# Patient Record
Sex: MALE | Race: BLACK OR AFRICAN AMERICAN | Employment: FULL TIME | URBAN - METROPOLITAN AREA
[De-identification: names, ages, dates, MRNs, and addresses within clinical notes are randomized per-mention and may not be internally consistent; named-entity substitution may affect disease eponyms.]

---

## 2017-10-30 ENCOUNTER — OFFICE VISIT (OUTPATIENT)
Dept: FAMILY MEDICINE CLINIC | Age: 42
End: 2017-10-30

## 2017-10-30 VITALS
RESPIRATION RATE: 20 BRPM | DIASTOLIC BLOOD PRESSURE: 85 MMHG | HEART RATE: 79 BPM | WEIGHT: 240 LBS | HEIGHT: 75 IN | SYSTOLIC BLOOD PRESSURE: 122 MMHG | OXYGEN SATURATION: 97 % | BODY MASS INDEX: 29.84 KG/M2 | TEMPERATURE: 98.2 F

## 2017-10-30 DIAGNOSIS — R06.83 SNORING: ICD-10-CM

## 2017-10-30 DIAGNOSIS — H53.9 CHANGES IN VISION: ICD-10-CM

## 2017-10-30 DIAGNOSIS — Z23 ENCOUNTER FOR IMMUNIZATION: ICD-10-CM

## 2017-10-30 DIAGNOSIS — Z13.220 SCREENING, LIPID: ICD-10-CM

## 2017-10-30 DIAGNOSIS — Z00.00 ROUTINE GENERAL MEDICAL EXAMINATION AT A HEALTH CARE FACILITY: Primary | ICD-10-CM

## 2017-10-30 DIAGNOSIS — G47.00 INSOMNIA, UNSPECIFIED TYPE: ICD-10-CM

## 2017-10-30 NOTE — PROGRESS NOTES
Subjective:   Yuriy Mueller is a 36 y.o. male presenting for his annual checkup. Reports BP and cholesterol were high at a workplace health fair about 3 months ago. He stopped eating meat after receiving the results, has been exercising regularly, and has lost 15 lbs. ROS:  Feeling well. No dyspnea or chest pain on exertion. No abdominal pain, change in bowel habits, black or bloody stools. No urinary tract or prostatic symptoms. No neurological complaints. Specific concerns today: has had a small lump on upper back for several years, not changing, not tender or painful. It seemed to flatten out after lying on weight bench at the gym, but has returned to it's former shape. Having trouble sleeping- takes 1-2 benadryl nightly. Falls asleep okay, wakes up after 4-5 hours and can't fall asleep again. Also reports loud snoring, wakes himself up at times, wife reports pauses in breathing. Has noticed some improvement after recent weight loss, but symptoms continue to happen multiple times each night. Had a colonscopy about 2 years ago after having blood in stool. Screen was normal per his report, 5 year interval to rescreen was recommended. C/o recent change in vision, finds himself adjusting distance of reading materials to help see it clearly. Last Tdap more than 10 years ago. There is no problem list on file for this patient.     No Known Allergies  Past Medical History:   Diagnosis Date    History of difficulty sleeping      Past Surgical History:   Procedure Laterality Date    HX COLONOSCOPY       Family History   Problem Relation Age of Onset    Hypertension Mother     Cancer Father     Diabetes Father     Stroke Father      Social History   Substance Use Topics    Smoking status: Former Smoker     Years: 1.00     Quit date: 1/1/2011    Smokeless tobacco: Never Used    Alcohol use Yes      Comment: 20 drinks per week (liquor or wine), 20 year drinking history         Objective: Visit Vitals    /85 (BP 1 Location: Right arm, BP Patient Position: Sitting)    Pulse 79    Temp 98.2 °F (36.8 °C) (Oral)    Resp 20    Ht 6' 3\" (1.905 m)    Wt 240 lb (108.9 kg)    SpO2 97%    BMI 30 kg/m2     The patient appears well, alert, oriented x 3, in no distress. ENT normal.  Neck supple. No adenopathy or thyromegaly. JOSSELIN. Lungs are clear, good air entry, no wheezes, rhonchi or rales. S1 and S2 normal, no murmurs, regular rate and rhythm. Abdomen is soft without tenderness, guarding, mass or organomegaly. Extremities show no edema, normal peripheral pulses. Neurological is normal without focal findings. Skin shows normal coloration and turgor, small, nontender lipoma upper back near midline. Assessment/Plan:   healthy adult male  lose weight, increase physical activity, limit alcohol consumption, follow low fat diet, follow low salt diet, routine labs ordered. Diagnoses and all orders for this visit:    1. Routine general medical examination at a health care facility  Recommend continued weight loss through healthy diet and regular exercise. Limit alcohol consumption- no more than 2 drinks nightly recommended for adult males  -     CBC WITH AUTOMATED DIFF  -     METABOLIC PANEL, COMPREHENSIVE    2. Encounter for immunization  -     Tetanus, diphtheria toxoids and acellular pertussis (TDAP) vaccine, in individuals >=7 years, IM  -     IL IMMUNIZ ADMIN,1 SINGLE/COMB VAC/TOXOID  -     Influenza virus vaccine (QUADRIVALENT PRES FREE SYRINGE) IM (04557)    3. Screening, lipid  -     LIPID PANEL    4. Snoring  5. Insomnia, unspecified type  -     SLEEP MEDICINE REFERRAL  -     TSH 3RD GENERATION    6.  Changes in vision  Suspect normal age-related changes  Refer for further eval  -     REFERRAL TO OPTOMETRY    Follow-up Disposition:  Return in about 1 year (around 10/30/2018) for yearly physical.     I have discussed the diagnosis with the patient and the intended plan as seen in the above orders. The patient has received an after-visit summary and questions were answered concerning future plans. Patient conveyed understanding of the plan at the time of the visit.     Duncan Muniz NP  10/30/17

## 2017-10-30 NOTE — PATIENT INSTRUCTIONS
Vaccine Information Statement     Tdap (Tetanus, Diphtheria, Pertussis) Vaccine: What You Need to Know    Many Vaccine Information Statements are available in [place] and other languages. See www.immunize.org/vis. Hojas de Información Sobre Vacunas están disponibles en español y en muchos otros idiomas. Visite TongScale.si    1. Why get vaccinated? Tetanus, diphtheria, and pertussis are very serious diseases. Tdap vaccine can protect us from these diseases. And, Tdap vaccine given to pregnant women can protect  babies against pertussis. TETANUS (Lockjaw) is rare in the [facility] today. It causes painful muscle tightening and stiffness, usually all over the body.  It can lead to tightening of muscles in the head and neck so you cant open your mouth, swallow, or sometimes even breathe. Tetanus kills about 1 out of 10 people who are infected even after receiving the best medical care. DIPHTHERIA is also rare in the [facility] today. It can cause a thick coating to form in the back of the throat.  It can lead to breathing problems, heart failure, paralysis, and death. PERTUSSIS (Whooping Cough) causes severe coughing spells, which can cause difficulty breathing, vomiting, and disturbed sleep.  It can also lead to weight loss, incontinence, and rib fractures. Up to 2 in 100 adolescents and 5 in 100 adults with pertussis are hospitalized or have complications, which could include pneumonia or death. These diseases are caused by bacteria. Diphtheria and pertussis are spread from person to person through secretions from coughing or sneezing. Tetanus enters the body through cuts, scratches, or wounds. Before vaccines, as many as 200,000 cases of diphtheria, 200,000 cases of pertussis, and hundreds of cases of tetanus, were reported in the [place] each year.  Since vaccination began, reports of cases for tetanus and diphtheria have dropped by about 99% and for pertussis by about 80%. 2. Tdap vaccine    Tdap vaccine can protect adolescents and adults from tetanus, diphtheria, and pertussis. One dose of Tdap is routinely given at age 6 or 15. People who did not get Tdap at that age should get it as soon as possible. Tdap is especially important for health care professionals and anyone having close contact with a baby younger than 12 months. Pregnant women should get a dose of Tdap during every pregnancy, to protect the  from pertussis. Infants are most at risk for severe, life-threatening complications from pertussis. Another vaccine, called Td, protects against tetanus and diphtheria, but not pertussis. A Td booster should be given every 10 years. Tdap may be given as one of these boosters if you have never gotten Tdap before. Tdap may also be given after a severe cut or burn to prevent tetanus infection. Your doctor or the person giving you the vaccine can give you more information. Tdap may safely be given at the same time as other vaccines. 3. Some people should not get this vaccine     A person who has ever had a life-threatening allergic reaction after a previous dose of any diphtheria, tetanus or pertussis containing vaccine, OR has a severe allergy to any part of this vaccine, should not get Tdap vaccine. Tell the person giving the vaccine about any severe allergies.  Anyone who had coma or long repeated seizures within 7 days after a childhood dose of DTP or DTaP, or a previous dose of Tdap, should not get Tdap, unless a cause other than the vaccine was found. They can still get Td.  Talk to your doctor if you:  - have seizures or another nervous system problem,  - had severe pain or swelling after any vaccine containing diphtheria, tetanus or pertussis,   - ever had a condition called Guillain Barré Syndrome (GBS),  - arent feeling well on the day the shot is scheduled.     4. Risks    With any medicine, including vaccines, there is a chance of side effects. These are usually mild and go away on their own. Serious reactions are also possible but are rare. Most people who get Tdap vaccine do not have any problems with it. Mild Problems following Tdap  (Did not interfere with activities)   Pain where the shot was given (about 3 in 4 adolescents or 2 in 3 adults)   Redness or swelling where the shot was given (about 1 person in 5)   Mild fever of at least 100.4°F (up to about 1 in 25 adolescents or 1 in 100 adults)   Headache (about 3 or 4 people in 10)   Tiredness (about 1 person in 3 or 4)   Nausea, vomiting, diarrhea, stomach ache (up to 1 in 4 adolescents or 1 in 10 adults)   Chills,  sore joints (about 1 person in 10)   Body aches (about 1 person in 3 or 4)    Rash, swollen glands (uncommon)    Moderate Problems following Tdap  (Interfered with activities, but did not require medical attention)   Pain where the shot was given (up to 1 in 5 or 6)    Redness or swelling where the shot was given (up to about 1 in 16 adolescents or 1 in 12 adults)   Fever over 102°F (about 1 in 100 adolescents or 1 in 250 adults)   Headache (about 1 in 7 adolescents or 1 in 10 adults)   Nausea, vomiting, diarrhea, stomach ache (up to 1 or 3 people in 100)   Swelling of the entire arm where the shot was given (up to about 1 in 500). Severe Problems following Tdap  (Unable to perform usual activities; required medical attention)   Swelling, severe pain, bleeding, and redness in the arm where the shot was given (rare). Problems that could happen after any vaccine:     People sometimes faint after a medical procedure, including vaccination. Sitting or lying down for about 15 minutes can help prevent fainting, and injuries caused by a fall. Tell your doctor if you feel dizzy, or have vision changes or ringing in the ears.      Some people get severe pain in the shoulder and have difficulty moving the arm where a shot was given. This happens very rarely.  Any medication can cause a severe allergic reaction. Such reactions from a vaccine are very rare, estimated at fewer than 1 in a million doses, and would happen within a few minutes to a few hours after the vaccination. As with any medicine, there is a very remote chance of a vaccine causing a serious injury or death. The safety of vaccines is always being monitored. For more information, visit: www.cdc.gov/vaccinesafety/    5. What if there is a serious problem? What should I look for?  Look for anything that concerns you, such as signs of a severe allergic reaction, very high fever, or unusual behavior.  Signs of a severe allergic reaction can include hives, swelling of the face and throat, difficulty breathing, a fast heartbeat, dizziness, and weakness. These would usually start a few minutes to a few hours after the vaccination. What should I do?  If you think it is a severe allergic reaction or other emergency that cant wait, call 9-1-1 or get the person to the nearest hospital. Otherwise, call your doctor.  Afterward, the reaction should be reported to the Vaccine Adverse Event Reporting System (VAERS). Your doctor might file this report, or you can do it yourself through the VAERS web site at www.vaers. [facility].gov, or by calling 1-914.964.8403. [employer] does not give medical advice. 6. The National Vaccine Injury Compensation Program    The [facility] Vaccine Injury Compensation Program (VICP) is a federal program that was created to compensate people who may have been injured by certain vaccines. Persons who believe they may have been injured by a vaccine can learn about the program and about filing a claim by calling 6-423.998.7595 or visiting the [employer] website at www.[facility].gov/vaccinecompensation. There is a time limit to file a claim for compensation. 7. How can I learn more?  Ask your doctor.  He or she can give you the vaccine package insert or suggest other sources of information.  Call your local or state health department.  Contact the Centers for Disease Control and Prevention (CDC):  - Call 8-300.198.7746 (1-800-CDC-INFO) or  - Visit CDCs website at www.cdc.gov/vaccines      Vaccine Information Statement   Tdap Vaccine  (2/24/2015)  42 RAHEEM Gomez [id number]    Department of Health and Human Services  Centers for Disease Control and Prevention    Office Use Only       Learning About Sleeping Well  What does sleeping well mean? Sleeping well means getting enough sleep. How much sleep is enough varies among people. The number of hours you sleep is not as important as how you feel when you wake up. If you do not feel refreshed, you probably need more sleep. Another sign of not getting enough sleep is feeling tired during the day. The average total nightly sleep time is 7½ to 8 hours. Healthy adults may need a little more or a little less than this. Why is getting enough sleep important? Getting enough quality sleep is a basic part of good health. When your sleep suffers, your mood and your thoughts can suffer too. You may find yourself feeling more grumpy or stressed. Not getting enough sleep also can lead to serious problems, including injury, accidents, anxiety, and depression. What might cause poor sleeping? Many things can cause sleep problems, including:  · Stress. Stress can be caused by fear about a single event, such as giving a speech. Or you may have ongoing stress, such as worry about work or school. · Depression, anxiety, and other mental or emotional conditions. · Changes in your sleep habits or surroundings. This includes changes that happen where you sleep, such as noise, light, or sleeping in a different bed. It also includes changes in your sleep pattern, such as having jet lag or working a late shift. · Health problems, such as pain, breathing problems, and restless legs syndrome.   · Lack of regular exercise. How can you help yourself? Here are some tips that may help you sleep more soundly and wake up feeling more refreshed. Your sleeping area  · Use your bedroom only for sleeping and sex. A bit of light reading may help you fall asleep. But if it doesn't, do your reading elsewhere in the house. Don't watch TV in bed. · Be sure your bed is big enough to stretch out comfortably, especially if you have a sleep partner. · Keep your bedroom quiet, dark, and cool. Use curtains, blinds, or a sleep mask to block out light. To block out noise, use earplugs, soothing music, or a \"white noise\" machine. Your evening and bedtime routine  · Create a relaxing bedtime routine. You might want to take a warm shower or bath, listen to soothing music, or drink a cup of noncaffeinated tea. · Go to bed at the same time every night. And get up at the same time every morning, even if you feel tired. What to avoid  · Limit caffeine (coffee, tea, caffeinated sodas) during the day, and don't have any for at least 4 to 6 hours before bedtime. · Don't drink alcohol before bedtime. Alcohol can cause you to wake up more often during the night. · Don't smoke or use tobacco, especially in the evening. Nicotine can keep you awake. · Don't take naps during the day, especially close to bedtime. · Don't lie in bed awake for too long. If you can't fall asleep, or if you wake up in the middle of the night and can't get back to sleep within 15 minutes or so, get out of bed and go to another room until you feel sleepy. · Don't take medicine right before bed that may keep you awake or make you feel hyper or energized. Your doctor can tell you if your medicine may do this and if you can take it earlier in the day. If you can't sleep  · Imagine yourself in a peaceful, pleasant scene. Focus on the details and feelings of being in a place that is relaxing. · Get up and do a quiet or boring activity until you feel sleepy.   · Don't drink any liquids after 6 p.m. if you wake up often because you have to go to the bathroom. Where can you learn more? Go to http://danitza-codey.info/. Enter J834 in the search box to learn more about \"Learning About Sleeping Well. \"  Current as of: May 12, 2017  Content Version: 11.4  © 7192-1691 [employer]. Care instructions adapted under license by [employer] (which disclaims liability or warranty for this information). If you have questions about a medical condition or this instruction, always ask your healthcare professional. [place] [address] any warranty or liability for your use of this information. Well Visit, Ages 25 to 48: Care Instructions  Your Care Instructions    Physical exams can help you stay healthy. Your doctor has checked your overall health and may have suggested ways to take good care of yourself. He or she also may have recommended tests. At home, you can help prevent illness with healthy eating, regular exercise, and other steps. Follow-up care is a key part of your treatment and safety. Be sure to make and go to all appointments, and call your doctor if you are having problems. It's also a good idea to know your test results and keep a list of the medicines you take. How can you care for yourself at home? · Reach and stay at a healthy weight. This will lower your risk for many problems, such as obesity, diabetes, heart disease, and high blood pressure. · Get at least 30 minutes of physical activity on most days of the week. Walking is a good choice. You also may want to do other activities, such as running, swimming, cycling, or playing tennis or team sports. Discuss any changes in your exercise program with your doctor. · Do not smoke or allow others to smoke around you. If you need help quitting, talk to your doctor about stop-smoking programs and medicines. These can increase your chances of quitting for good.   · Talk to your doctor about whether you have any risk factors for sexually transmitted infections (STIs). Having one sex partner (who does not have STIs and does not have sex with anyone else) is a good way to avoid these infections. · Use birth control if you do not want to have children at this time. Talk with your doctor about the choices available and what might be best for you. · Protect your skin from too much sun. When you're outdoors from 10 a.m. to 4 p.m., stay in the shade or cover up with clothing and a hat with a wide brim. Wear sunglasses that block UV rays. Even when it's cloudy, put broad-spectrum sunscreen (SPF 30 or higher) on any exposed skin. · See a dentist one or two times a year for checkups and to have your teeth cleaned. · Wear a seat belt in the car. · Drink alcohol in moderation, if at all. That means no more than 2 drinks a day for men and 1 drink a day for women. Follow your doctor's advice about when to have certain tests. These tests can spot problems early. For everyone  · Cholesterol. Have the fat (cholesterol) in your blood tested after age 21. Your doctor will tell you how often to have this done based on your age, family history, or other things that can increase your risk for heart disease. · Blood pressure. Have your blood pressure checked during a routine doctor visit. Your doctor will tell you how often to check your blood pressure based on your age, your blood pressure results, and other factors. · Vision. Talk with your doctor about how often to have a glaucoma test.  · Diabetes. Ask your doctor whether you should have tests for diabetes. · Colon cancer. Have a test for colon cancer at age 48. You may have one of several tests. If you are younger than 48, you may need a test earlier if you have any risk factors. Risk factors include whether you already had a precancerous polyp removed from your colon or whether your parent, brother, sister, or child has had colon cancer.   For women  · Breast exam and mammogram. Talk to your doctor about when you should have a clinical breast exam and a mammogram. Medical experts differ on whether and how often women under 50 should have these tests. Your doctor can help you decide what is right for you. · Pap test and pelvic exam. Begin Pap tests at age 24. A Pap test is the best way to find cervical cancer. The test often is part of a pelvic exam. Ask how often to have this test.  · Tests for sexually transmitted infections (STIs). Ask whether you should have tests for STIs. You may be at risk if you have sex with more than one person, especially if your partners do not wear condoms. For men  · Tests for sexually transmitted infections (STIs). Ask whether you should have tests for STIs. You may be at risk if you have sex with more than one person, especially if you do not wear a condom. · Testicular cancer exam. Ask your doctor whether you should check your testicles regularly. · Prostate exam. Talk to your doctor about whether you should have a blood test (called a PSA test) for prostate cancer. Experts differ on whether and when men should have this test. Some experts suggest it if you are older than 39 and are -American or have a father or brother who got prostate cancer when he was younger than 72. When should you call for help? Watch closely for changes in your health, and be sure to contact your doctor if you have any problems or symptoms that concern you. Where can you learn more? Go to http://danitza-codey.info/. Enter P072 in the search box to learn more about \"Well Visit, Ages 25 to 48: Care Instructions. \"  Current as of: May 12, 2017  Content Version: 11.4  © 6938-5592 [employer]. Care instructions adapted under license by [employer] (which disclaims liability or warranty for this information).  If you have questions about a medical condition or this instruction, always ask your healthcare professional. [address] any warranty or liability for your use of this information.

## 2017-10-30 NOTE — PROGRESS NOTES
Chief Complaint   Patient presents with   [facility] patient     Physical

## 2017-10-30 NOTE — MR AVS SNAPSHOT
Visit Information Date & Time Provider Department Dept. Phone Encounter #  
 10/30/2017  8:30 AM Melo Dawn NP [facility]9 [facility] [id number] Follow-up Instructions Return in about 1 year (around 10/30/2018) for yearly physical.  
  
Upcoming Health Maintenance Date Due DTaP/Tdap/Td series (1 - Tdap) 4/5/1995 INFLUENZA AGE 9 TO ADULT 8/1/2017 Allergies as of 10/30/2017  Review Complete On: 10/30/2017 By: Melo Dawn NP No Known Allergies Current Immunizations  Never Reviewed Name Date Tdap  Incomplete Not reviewed this visit You Were Diagnosed With   
  
 Codes Comments Routine general medical examination at a health care facility    -  Primary ICD-10-CM: Z00.00 ICD-9-CM: V70.0 Encounter for immunization     ICD-10-CM: B13 ICD-9-CM: V03.89 Screening, lipid     ICD-10-CM: Z92.366 ICD-9-CM: V77.91 Snoring     ICD-10-CM: R06.83 
ICD-9-CM: 786.09 Insomnia, unspecified type     ICD-10-CM: G47.00 ICD-9-CM: 780.52 Changes in vision     ICD-10-CM: H53.9 ICD-9-CM: 368.9 Vitals BP Pulse Temp Resp Height(growth percentile) Weight(growth percentile) 122/85 (BP 1 Location: Right arm, BP Patient Position: Sitting) 79 98.2 °F (36.8 °C) (Oral) 20 6' 3\" (1.905 m) 240 lb (108.9 kg) SpO2 BMI Smoking Status 97% 30 kg/m2 Former Smoker Vitals History BMI and BSA Data Body Mass Index Body Surface Area  
 30 kg/m 2 2.4 m 2 Preferred Pharmacy Pharmacy Name Phone [id number] [address] [facility] [address] [phone] Your Updated Medication List  
  
   
This list is accurate as of: 10/30/17  9:16 AM.  Always use your most recent med list.  
  
  
  
  
 UNISOM (DIPHENHYDRAMINE) PO Take  by mouth. We Performed the Following [facility] IMMUNIZ ADMIN,1 SINGLE/COMB VAC/TOXOID [id number] CPT(R)] REFERRAL TO OPTOMETRY [id number] Custom] Comments:  
 eval for routine eye exam, recent change in vision SLEEP MEDICINE REFERRAL [[id number] Custom] Comments:  
 Orders: 
Sleep Medicine Consult - Schedule patient for a sleep specialist consult. If appropriate, schedule patient for sleep study(s). Initiate treatment if needed. Forward correspondance to my office. TETANUS, DIPHTHERIA TOXOIDS AND ACELLULAR PERTUSSIS VACCINE (TDAP), IN INDIVIDS. >=7, IM [id number] CPT(R)] Follow-up Instructions Return in about 1 year (around 10/30/2018) for yearly physical.  
  
  
Referral Information Referral ID Referred By Referred To  
  
 [id number] Gloria Huber MD   
   [facility] [place] [address] Phone: [phone] Fax: [phone] Visits Status Start Date End Date 1 New Request 10/30/17 10/30/18 If your referral has a status of pending review or denied, additional information will be sent to support the outcome of this decision. Referral ID Referred By Referred To  
 [id number] Lynette Gomez [facility], OD  
   [address] [address] [facility] Phone: [phone] Fax: [phone] Visits Status Start Date End Date 1 New Request 10/30/17 10/30/18 If your referral has a status of pending review or denied, additional information will be sent to support the outcome of this decision. Patient Instructions Vaccine Information Statement Tdap (Tetanus, Diphtheria, Pertussis) Vaccine: What You Need to Know Many Vaccine Information Statements are available in Nauruan and other languages. See www.immunize.org/vis. Hojas de Información Sobre Vacunas están disponibles en español y en muchos otros idiomas. Visite MonetteScale.si 1. Why get vaccinated? Tetanus, diphtheria, and pertussis are very serious diseases.  Tdap vaccine can protect us from these diseases. And, Tdap vaccine given to pregnant women can protect  babies against pertussis. TETANUS (Lockjaw) is rare in the [facility] today. It causes painful muscle tightening and stiffness, usually all over the body. ? It can lead to tightening of muscles in the head and neck so you cant open your mouth, swallow, or sometimes even breathe. Tetanus kills about 1 out of 10 people who are infected even after receiving the best medical care. DIPHTHERIA is also rare in the [facility] today. It can cause a thick coating to form in the back of the throat. ? It can lead to breathing problems, heart failure, paralysis, and death. PERTUSSIS (Whooping Cough) causes severe coughing spells, which can cause difficulty breathing, vomiting, and disturbed sleep. ? It can also lead to weight loss, incontinence, and rib fractures. Up to 2 in 100 adolescents and 5 in 100 adults with pertussis are hospitalized or have complications, which could include pneumonia or death. These diseases are caused by bacteria. Diphtheria and pertussis are spread from person to person through secretions from coughing or sneezing. Tetanus enters the body through cuts, scratches, or wounds. Before vaccines, as many as 200,000 cases of diphtheria, 200,000 cases of pertussis, and hundreds of cases of tetanus, were reported in the [place] each year. Since vaccination began, reports of cases for tetanus and diphtheria have dropped by about 99% and for pertussis by about 80%. 2. Tdap vaccine Tdap vaccine can protect adolescents and adults from tetanus, diphtheria, and pertussis. One dose of Tdap is routinely given at age 6 or 15. People who did not get Tdap at that age should get it as soon as possible. Tdap is especially important for health care professionals and anyone having close contact with a baby younger than 12 months. Pregnant women should get a dose of Tdap during every pregnancy, to protect the  from pertussis. Infants are most at risk for severe, life-threatening complications from pertussis. Another vaccine, called Td, protects against tetanus and diphtheria, but not pertussis. A Td booster should be given every 10 years. Tdap may be given as one of these boosters if you have never gotten Tdap before. Tdap may also be given after a severe cut or burn to prevent tetanus infection. Your doctor or the person giving you the vaccine can give you more information. Tdap may safely be given at the same time as other vaccines. 3. Some people should not get this vaccine  A person who has ever had a life-threatening allergic reaction after a previous dose of any diphtheria, tetanus or pertussis containing vaccine, OR has a severe allergy to any part of this vaccine, should not get Tdap vaccine. Tell the person giving the vaccine about any severe allergies.  Anyone who had coma or long repeated seizures within 7 days after a childhood dose of DTP or DTaP, or a previous dose of Tdap, should not get Tdap, unless a cause other than the vaccine was found. They can still get Td.  Talk to your doctor if you: 
- have seizures or another nervous system problem, 
- had severe pain or swelling after any vaccine containing diphtheria, tetanus or pertussis,  
- ever had a condition called Guillain Barré Syndrome (GBS), 
- arent feeling well on the day the shot is scheduled. 4. Risks With any medicine, including vaccines, there is a chance of side effects. These are usually mild and go away on their own. Serious reactions are also possible but are rare. Most people who get Tdap vaccine do not have any problems with it. Mild Problems following Tdap 
(Did not interfere with activities)  Pain where the shot was given (about 3 in 4 adolescents or 2 in 3 adults)  Redness or swelling where the shot was given (about 1 person in 5)  Mild fever of at least 100.4°F (up to about 1 in 25 adolescents or 1 in 100 adults)  Headache (about 3 or 4 people in 10)  Tiredness (about 1 person in 3 or 4)  Nausea, vomiting, diarrhea, stomach ache (up to 1 in 4 adolescents or 1 in 10 adults)  Chills,  sore joints (about 1 person in 10)  Body aches (about 1 person in 3 or 4)  Rash, swollen glands (uncommon) Moderate Problems following Tdap (Interfered with activities, but did not require medical attention)  Pain where the shot was given (up to 1 in 5 or 6)  Redness or swelling where the shot was given (up to about 1 in 16 adolescents or 1 in 12 adults)  Fever over 102°F (about 1 in 100 adolescents or 1 in 250 adults)  Headache (about 1 in 7 adolescents or 1 in 10 adults)  Nausea, vomiting, diarrhea, stomach ache (up to 1 or 3 people in 100)  Swelling of the entire arm where the shot was given (up to about 1 in 500). Severe Problems following Tdap 
(Unable to perform usual activities; required medical attention)  Swelling, severe pain, bleeding, and redness in the arm where the shot was given (rare). Problems that could happen after any vaccine:  People sometimes faint after a medical procedure, including vaccination. Sitting or lying down for about 15 minutes can help prevent fainting, and injuries caused by a fall. Tell your doctor if you feel dizzy, or have vision changes or ringing in the ears.  Some people get severe pain in the shoulder and have difficulty moving the arm where a shot was given. This happens very rarely.  Any medication can cause a severe allergic reaction. Such reactions from a vaccine are very rare, estimated at fewer than 1 in a million doses, and would happen within a few minutes to a few hours after the vaccination.   
 
As with any medicine, there is a very remote chance of a vaccine causing a serious injury or death. The safety of vaccines is always being monitored. For more information, visit: www.cdc.gov/vaccinesafety/ 
 
 
The [facility] Vaccine Injury Compensation Program (VICP) is a federal program that was created to compensate people who may have been injured by certain vaccines. Persons who believe they may have been injured by a vaccine can learn about the program and about filing a claim by calling 9-170.476.1960 or visiting the [employer] website at www.[facility].gov/vaccinecompensation. There is a time limit to file a claim for compensation. 7. How can I learn more?  Ask your doctor. He or she can give you the vaccine package insert or suggest other sources of information.  Call your local or state health department.  Contact the Centers for Disease Control and Prevention (CDC): 
- Call 0-365.215.3504 (1-800-CDC-INFO) or 
- Visit CDCs website at www.cdc.gov/vaccines Vaccine Information Statement Tdap Vaccine 
(2/24/2015) 42 PAULAAnne Guzmán Alfredo 762HK-95 Department of Health and [employer] Centers for Disease Control and Prevention Office Use Only Learning About Sleeping Well What does sleeping well mean? Sleeping well means getting enough sleep. How much sleep is enough varies among people. The number of hours you sleep is not as important as how you feel when you wake up. If you do not feel refreshed, you probably need more sleep. Another sign of not getting enough sleep is feeling tired during the day. The average total nightly sleep time is 7½ to 8 hours. Healthy adults may need a little more or a little less than this. Why is getting enough sleep important? Getting enough quality sleep is a basic part of good health. When your sleep suffers, your mood and your thoughts can suffer too. You may find yourself feeling more grumpy or stressed. Not getting enough sleep also can lead to serious problems, including injury, accidents, anxiety, and depression. What might cause poor sleeping? Many things can cause sleep problems, including: · Stress. Stress can be caused by fear about a single event, such as giving a speech. Or you may have ongoing stress, such as worry about work or school. · Depression, anxiety, and other mental or emotional conditions. · Changes in your sleep habits or surroundings. This includes changes that happen where you sleep, such as noise, light, or sleeping in a different bed. It also includes changes in your sleep pattern, such as having jet lag or working a late shift. · Health problems, such as pain, breathing problems, and restless legs syndrome. · Lack of regular exercise. How can you help yourself? Here are some tips that may help you sleep more soundly and wake up feeling more refreshed. Your sleeping area · Use your bedroom only for sleeping and sex. A bit of light reading may help you fall asleep. But if it doesn't, do your reading elsewhere in the house. Don't watch TV in bed. · Be sure your bed is big enough to stretch out comfortably, especially if you have a sleep partner. · Keep your bedroom quiet, dark, and cool. Use curtains, blinds, or a sleep mask to block out light. To block out noise, use earplugs, soothing music, or a \"white noise\" machine. Your evening and bedtime routine · Create a relaxing bedtime routine. You might want to take a warm shower or bath, listen to soothing music, or drink a cup of noncaffeinated tea. · Go to bed at the same time every night. And get up at the same time every morning, even if you feel tired. What to avoid · Limit caffeine (coffee, tea, caffeinated sodas) during the day, and don't have any for at least 4 to 6 hours before bedtime. · Don't drink alcohol before bedtime. Alcohol can cause you to wake up more often during the night. · Don't smoke or use tobacco, especially in the evening. Nicotine can keep you awake. · Don't take naps during the day, especially close to bedtime. · Don't lie in bed awake for too long. If you can't fall asleep, or if you wake up in the middle of the night and can't get back to sleep within 15 minutes or so, get out of bed and go to another room until you feel sleepy. · Don't take medicine right before bed that may keep you awake or make you feel hyper or energized. Your doctor can tell you if your medicine may do this and if you can take it earlier in the day. If you can't sleep · Imagine yourself in a peaceful, pleasant scene. Focus on the details and feelings of being in a place that is relaxing. · Get up and do a quiet or boring activity until you feel sleepy. · Don't drink any liquids after 6 p.m. if you wake up often because you have to go to the bathroom. Where can you learn more? Go to http://danitza-codey.info/. Enter M386 in the search box to learn more about \"Learning About Sleeping Well. \" Current as of: May 12, 2017 Content Version: 11.4 © 7662-9504 Healthwise, [employer]. Care instructions adapted under license by [employer] (which disclaims liability or warranty for this information). If you have questions about a medical condition or this instruction, always ask your healthcare professional. [address] any warranty or liability for your use of this information. Well Visit, Ages 25 to 48: Care Instructions Your Care Instructions Physical exams can help you stay healthy. Your doctor has checked your overall health and may have suggested ways to take good care of yourself. He or she also may have recommended tests. At home, you can help prevent illness with healthy eating, regular exercise, and other steps. Follow-up care is a key part of your treatment and safety. Be sure to make and go to all appointments, and call your doctor if you are having problems. It's also a good idea to know your test results and keep a list of the medicines you take. How can you care for yourself at home? · Reach and stay at a healthy weight. This will lower your risk for many problems, such as obesity, diabetes, heart disease, and high blood pressure. · Get at least 30 minutes of physical activity on most days of the week. Walking is a good choice. You also may want to do other activities, such as running, swimming, cycling, or playing tennis or team sports. Discuss any changes in your exercise program with your doctor. · Do not smoke or allow others to smoke around you. If you need help quitting, talk to your doctor about stop-smoking programs and medicines. These can increase your chances of quitting for good. · Talk to your doctor about whether you have any risk factors for sexually transmitted infections (STIs). Having one sex partner (who does not have STIs and does not have sex with anyone else) is a good way to avoid these infections. · Use birth control if you do not want to have children at this time.  Talk with your doctor about the choices available and what might be best for you. · Protect your skin from too much sun. When you're outdoors from 10 a.m. to 4 p.m., stay in the shade or cover up with clothing and a hat with a wide brim. Wear sunglasses that block UV rays. Even when it's cloudy, put broad-spectrum sunscreen (SPF 30 or higher) on any exposed skin. · See a dentist one or two times a year for checkups and to have your teeth cleaned. · Wear a seat belt in the car. · Drink alcohol in moderation, if at all. That means no more than 2 drinks a day for men and 1 drink a day for women. Follow your doctor's advice about when to have certain tests. These tests can spot problems early. For everyone · Cholesterol. Have the fat (cholesterol) in your blood tested after age 21. Your doctor will tell you how often to have this done based on your age, family history, or other things that can increase your risk for heart disease. · Blood pressure. Have your blood pressure checked during a routine doctor visit. Your doctor will tell you how often to check your blood pressure based on your age, your blood pressure results, and other factors. · Vision. Talk with your doctor about how often to have a glaucoma test. 
· Diabetes. Ask your doctor whether you should have tests for diabetes. · Colon cancer. Have a test for colon cancer at age 48. You may have one of several tests. If you are younger than 48, you may need a test earlier if you have any risk factors. Risk factors include whether you already had a precancerous polyp removed from your colon or whether your parent, brother, sister, or child has had colon cancer. For women · Breast exam and mammogram. Talk to your doctor about when you should have a clinical breast exam and a mammogram. Medical experts differ on whether and how often women under 50 should have these tests. Your doctor can help you decide what is right for you. · Pap test and pelvic exam. Begin Pap tests at age 24. A Pap test is the best way to find cervical cancer. The test often is part of a pelvic exam. Ask how often to have this test. 
· Tests for sexually transmitted infections (STIs). Ask whether you should have tests for STIs. You may be at risk if you have sex with more than one person, especially if your partners do not wear condoms. For men · Tests for sexually transmitted infections (STIs). Ask whether you should have tests for STIs. You may be at risk if you have sex with more than one person, especially if you do not wear a condom. · Testicular cancer exam. Ask your doctor whether you should check your testicles regularly. · Prostate exam. Talk to your doctor about whether you should have a blood test (called a PSA test) for prostate cancer. Experts differ on whether and when men should have this test. Some experts suggest it if you are older than 39 and are -American or have a father or brother who got prostate cancer when he was younger than 72. When should you call for help? Watch closely for changes in your health, and be sure to contact your doctor if you have any problems or symptoms that concern you. Where can you learn more? Go to http://danitza-codey.info/. Enter P072 in the search box to learn more about \"Well Visit, Ages 25 to 48: Care Instructions. \" Current as of: May 12, 2017 Content Version: 11.4 © 0984-3714 Healthwise, Inc[employer]. Care instructions adapted under license by [employer] (which disclaims liability or warranty for this information). If you have questions about a medical condition or this instruction, always ask your healthcare professional. [address] any warranty or liability for your use of this information. Introducing [facility]!    
 Candis Moyer introduces [employer] patient portal. Now you can access parts of your medical record, email your doctor's office, and request medication refills online. 1. In your internet browser, go to [email or web address]/[email or web address] 2. Click on the First Time User? Click Here link in the Sign In box. You will see the New Member Sign Up page. 3. Enter your [employer] Access Code exactly as it appears below. You will not need to use this code after youve completed the sign-up process. If you do not sign up before the expiration date, you must request a new code. · [employer] Access Code: [email or web address] Expires: 1/28/2018  9:13 AM 
 
4. Enter the last four digits of your Social Security Number (xxxx) and Date of Birth (mm/dd/yyyy) as indicated and click Submit. You will be taken to the next sign-up page. 5. Create a [employer] ID. This will be your [employer] login ID and cannot be changed, so think of one that is secure and easy to remember. 6. Create a [employer] password. You can change your password at any time. 7. Enter your Password Reset Question and Answer. This can be used at a later time if you forget your password. 8. Enter your e-mail address. You will receive e-mail notification when new information is available in [address] 9. Click Sign Up. You can now view and download portions of your medical record. 10. Click the Download Summary menu link to download a portable copy of your medical information. If you have questions, please visit the Frequently Asked Questions section of the [employer] website. Remember, [employer] is NOT to be used for urgent needs. For medical emergencies, dial 911. Now available from your iPhone and Android! Please provide this summary of care documentation to your next provider. If you have any questions after today's visit, please call [phone].

## 2017-10-31 LAB
ALBUMIN SERPL-MCNC: 4.4 G/DL (ref 3.5–5.5)
ALBUMIN/GLOB SERPL: 1.4 {RATIO} (ref 1.2–2.2)
ALP SERPL-CCNC: 78 IU/L (ref 39–117)
ALT SERPL-CCNC: 34 IU/L (ref 0–44)
AST SERPL-CCNC: 32 IU/L (ref 0–40)
BASOPHILS # BLD AUTO: 0 X10E3/UL (ref 0–0.2)
BASOPHILS NFR BLD AUTO: 0 %
BILIRUB SERPL-MCNC: 0.5 MG/DL (ref 0–1.2)
BUN SERPL-MCNC: 14 MG/DL (ref 6–24)
BUN/CREAT SERPL: 12 (ref 9–20)
CALCIUM SERPL-MCNC: 9.4 MG/DL (ref 8.7–10.2)
CHLORIDE SERPL-SCNC: 98 MMOL/L (ref 96–106)
CHOLEST SERPL-MCNC: 226 MG/DL (ref 100–199)
CO2 SERPL-SCNC: 23 MMOL/L (ref 18–29)
CREAT SERPL-MCNC: 1.17 MG/DL (ref 0.76–1.27)
EOSINOPHIL # BLD AUTO: 0.1 X10E3/UL (ref 0–0.4)
EOSINOPHIL NFR BLD AUTO: 2 %
ERYTHROCYTE [DISTWIDTH] IN BLOOD BY AUTOMATED COUNT: 13.8 % (ref 12.3–15.4)
GFR SERPLBLD CREATININE-BSD FMLA CKD-EPI: 76 ML/MIN/1.73
GFR SERPLBLD CREATININE-BSD FMLA CKD-EPI: 88 ML/MIN/1.73
GLOBULIN SER CALC-MCNC: 3.2 G/DL (ref 1.5–4.5)
GLUCOSE SERPL-MCNC: 91 MG/DL (ref 65–99)
HCT VFR BLD AUTO: 44.2 % (ref 37.5–51)
HDLC SERPL-MCNC: 48 MG/DL
HGB BLD-MCNC: 14.5 G/DL (ref 12.6–17.7)
IMM GRANULOCYTES # BLD: 0 X10E3/UL (ref 0–0.1)
IMM GRANULOCYTES NFR BLD: 0 %
INTERPRETATION, 910389: NORMAL
LDLC SERPL CALC-MCNC: 123 MG/DL (ref 0–99)
LYMPHOCYTES # BLD AUTO: 1.4 X10E3/UL (ref 0.7–3.1)
LYMPHOCYTES NFR BLD AUTO: 24 %
MCH RBC QN AUTO: 29.8 PG (ref 26.6–33)
MCHC RBC AUTO-ENTMCNC: 32.8 G/DL (ref 31.5–35.7)
MCV RBC AUTO: 91 FL (ref 79–97)
MONOCYTES # BLD AUTO: 0.5 X10E3/UL (ref 0.1–0.9)
MONOCYTES NFR BLD AUTO: 9 %
NEUTROPHILS # BLD AUTO: 3.6 X10E3/UL (ref 1.4–7)
NEUTROPHILS NFR BLD AUTO: 65 %
PLATELET # BLD AUTO: 219 X10E3/UL (ref 150–379)
POTASSIUM SERPL-SCNC: 4 MMOL/L (ref 3.5–5.2)
PROT SERPL-MCNC: 7.6 G/DL (ref 6–8.5)
RBC # BLD AUTO: 4.87 X10E6/UL (ref 4.14–5.8)
SODIUM SERPL-SCNC: 139 MMOL/L (ref 134–144)
TRIGL SERPL-MCNC: 273 MG/DL (ref 0–149)
TSH SERPL DL<=0.005 MIU/L-ACNC: 2.96 UIU/ML (ref 0.45–4.5)
VLDLC SERPL CALC-MCNC: 55 MG/DL (ref 5–40)
WBC # BLD AUTO: 5.7 X10E3/UL (ref 3.4–10.8)

## 2018-08-27 ENCOUNTER — OFFICE VISIT (OUTPATIENT)
Dept: SLEEP MEDICINE | Age: 43
End: 2018-08-27

## 2018-08-27 VITALS
DIASTOLIC BLOOD PRESSURE: 82 MMHG | HEIGHT: 75 IN | WEIGHT: 240 LBS | BODY MASS INDEX: 29.84 KG/M2 | SYSTOLIC BLOOD PRESSURE: 126 MMHG | OXYGEN SATURATION: 97 % | HEART RATE: 66 BPM

## 2018-08-27 DIAGNOSIS — G47.33 OSA (OBSTRUCTIVE SLEEP APNEA): Primary | ICD-10-CM

## 2018-08-27 NOTE — PROGRESS NOTES
[address] [place]. [place], [address]  Tel.  [phone]  Fax. 100 [facility] 60  [address] [facility] [address], [address]  Tel.  [phone]  Fax. [phone] [facility] [address] [address]  Tel.  [phone]  Fax. [phone]       S>Anju Salmeron is a 39 y.o. male seen today to receive a home sleep testing unit (HST). · Patient was educated on proper hookup and operation of the HST. · Instruction forms and documentation were reviewed and signed. · The patient demonstrated good understanding of the HST. O>    Visit Vitals    /82    Pulse 66    Ht 6' 3\" (1.905 m)    Wt 240 lb (108.9 kg)    SpO2 97%    BMI 30 kg/m2    Neck circ. in \"inches\": 15.5    A>  No diagnosis found. P>  · General information regarding operations and maintenance of the device was provided. · He was provided information on sleep apnea including coresponding risk factors and the importance of proper treatment. · Follow-up appointment was made to return the HST. He will be contacted once the results have been reviewed. · He was asked to contact our office for any problems regarding his home sleep test study.

## 2018-08-27 NOTE — PATIENT INSTRUCTIONS
Sleep Apnea: Care Instructions  Your Care Instructions    Sleep apnea means that you frequently stop breathing for 10 seconds or longer during sleep. It can be mild to severe, based on the number of times an hour that you stop breathing or have slowed breathing. Blocked or narrowed airways in your nose, mouth, or throat can cause sleep apnea. Your airway can become blocked when your throat muscles and tongue relax during sleep. You can treat sleep apnea at home by making lifestyle changes. You also can use a CPAP breathing machine that keeps tissues in the throat from blocking your airway. Or your doctor may suggest that you use a breathing device while you sleep. It helps keep your airway open. This could be a device that you put in your mouth. Other examples include strips or disks that you use on your nose. In some cases, surgery may be needed to remove enlarged tissues in the throat. Follow-up care is a key part of your treatment and safety. Be sure to make and go to all appointments, and call your doctor if you are having problems. It's also a good idea to know your test results and keep a list of the medicines you take. How can you care for yourself at home? · Lose weight, if needed. It may reduce the number of times you stop breathing or have slowed breathing. · Sleep on your side. It may stop mild apnea. If you tend to roll onto your back, sew a pocket in the back of your pajama top. Put a tennis ball into the pocket, and stitch the pocket shut. This will help keep you from sleeping on your back. · Avoid alcohol and medicines such as sleeping pills and sedatives before bed. · Do not smoke. Smoking can make sleep apnea worse. If you need help quitting, talk to your doctor about stop-smoking programs and medicines. These can increase your chances of quitting for good. · Prop up the head of your bed 4 to 6 inches by putting bricks under the legs of the bed.   · Treat breathing problems, such as a stuffy nose, caused by a cold or allergies. · Try a continuous positive airway pressure (CPAP) breathing machine if your doctor recommends it. The machine keeps your airway open when you sleep. · If CPAP does not work for you, ask your doctor if you can try other breathing machines. A bilevel positive airway pressure machine uses one type of air pressure for breathing in and another type for breathing out. Another device raises or lowers air pressure as needed while you breathe. · Talk to your doctor if:  ¨ Your nose feels dry or bleeds when you use one of these machines. You may need to increase moisture in the air. A humidifier may help. ¨ Your nose is runny or stuffy from using a breathing machine. Decongestants or a corticosteroid nasal spray may help. ¨ You are sleepy during the day and it gets in the way of the normal things you do. Do not drive when you are drowsy. When should you call for help? Watch closely for changes in your health, and be sure to contact your doctor if:    · You still have sleep apnea even though you have made lifestyle changes.     · You are thinking of trying a device such as CPAP.     · You are having problems using a CPAP or similar machine. Where can you learn more? Go to http://danitza-codey.info/. Enter Y500 in the search box to learn more about \"Sleep Apnea: Care Instructions. \"  Current as of: December 6, 2017  Content Version: 11.7  © 3210-1602 [employer]. Care instructions adapted under license by [employer] (which disclaims liability or warranty for this information). If you have questions about a medical condition or this instruction, always ask your healthcare professional. [address] any warranty or liability for your use of this information.

## 2018-08-27 NOTE — PROGRESS NOTES
[facility] [place]. [place], [address]  Tel.  [phone]  Fax. [address] [facility] [address]  [place], [address] [facility]  Tel.  [phone]  Fax. [phone] [address]  Tel.  [phone]  Fax. [phone]       Chief Complaint       Chief Complaint   Patient presents with    Sleep Problem     NP refd by Sandy Metz for snoring and insomnia       HPI      Allyson Gardner is a  39y.o. year old male referred by Emelia Jules N.P. for evaluation of a sleep disorder  . The patient reports he has experienced daytime sleepiness, snoring, nocturnal awakening, witnessed apnea. He has a long history of difficulties with sleep maintenance. For years, he has been taking Unisom to assist with nocturnal awakening. He reports snoring which has been described as severe; these have been associated with apparent apnea. The patient retires at 10 pm and awakens at 4: 45 am. The patient notes that he will experience frequent awakening from sleep. In general he is able to return to sleep after awakening. he tends to awaken with an alarm. He notes he is tired on awakening. He is also fatigued in the early afternoon. He states that the snoring had been previously described as severe; heard through closed doors. He did lose 20 pounds since last year and has been told that the snoring is less pronounced. He does not take naps during the day. He may fall asleep watching TV, if seated and reading. He does not have headache on awakening. He denies cataplexy, hypnagogic hallucinations or sleep paralysis. The patient has not undergone diagnostic testing for the current problems. No Known Allergies    Current Outpatient Prescriptions   Medication Sig Dispense Refill    DIPHENHYDRAMINE HCL (UNISOM, DIPHENHYDRAMINE, PO) Take  by mouth. He  has a past medical history of History of difficulty sleeping.     He  has a past surgical history that includes hx colonoscopy. He family history includes Cancer in his father; Diabetes in his father; Hypertension in his mother; Stroke in his father. He  reports that he quit smoking about 7 years ago. He quit after 1.00 year of use. He has never used smokeless tobacco. He reports that he drinks alcohol. He reports that he does not use illicit drugs. Review of Systems:  Review of Systems   Constitutional: Negative for chills and fever. HENT: Negative for hearing loss and tinnitus. Eyes: Negative for blurred vision and double vision. Respiratory: Negative for cough and wheezing. Cardiovascular: Negative for chest pain and palpitations. Gastrointestinal: Negative for abdominal pain and heartburn. Genitourinary: Negative for frequency and urgency. Musculoskeletal: Negative for back pain and neck pain. Skin: Negative for itching and rash. Neurological: Negative for dizziness and headaches. Endo/Heme/Allergies: Does not bruise/bleed easily. Psychiatric/Behavioral: Negative for depression. The patient is not nervous/anxious. Objective:     Visit Vitals    /82    Pulse 66    Ht 6' 3\" (1.905 m)    Wt 240 lb (108.9 kg)    SpO2 97%    BMI 30 kg/m2     Body mass index is 30 kg/(m^2). General:   Conversant, cooperative   Eyes:  Pupils equal and reactive, no nystagmus,  flat disk margins. Normal A/V ratio   Oropharynx:   Mallampati score II, tongue normal   Tonsils:      Neck:   No carotid bruits; Neck circ. in \"inches\": 15.5   Chest/Lungs:  Clear on auscultation    CVS:  Normal rate, regular rhythm, 1+ dorsalis pedis, no distal edema   Skin:  Warm to touch; no obvious rashes   Neuro:  Speech fluent, face symmetrical, tongue movement normal   Psych:  Normal affect,  normal countenance        Assessment:       ICD-10-CM ICD-9-CM    1.  RITO (obstructive sleep apnea) G47.33 327.23      History of snoring and witnessed apnea consistent with sleep disordered breathing. He has been taking Unisom at bedtime primarily to assist with sleep maintenance difficulties. He will be evaluated with a home sleep test.  The results will be reviewed with him. Plan:     No orders of the defined types were placed in this encounter. * Patient has a history and examination consistent with the diagnosis of sleep apnea. * Home sleep testing was ordered for initial evaluation. * He was provided information on sleep apnea including corresponding risk factors and the importance of proper treatment. * Treatment options if indicated were reviewed today. Instructions:.  o Do not engage in activities requiring a normal degree of alertness if fatigue is present. o The patient understands that untreated or undertreated sleep apnea could impair judgement and the ability to function normally during the day.  o Call or return if symptoms worsen or persist.          Dawn Swanson MD, FAASM  Electronically signed 08/27/18       This note was created using voice recognition software. Despite editing, there may be syntax errors. This note will not be viewable in [address]

## 2018-08-29 ENCOUNTER — HOSPITAL ENCOUNTER (OUTPATIENT)
Dept: SLEEP MEDICINE | Age: 43
Discharge: HOME | End: 2018-08-29
Payer: COMMERCIAL

## 2018-08-29 PROCEDURE — 95806 SLEEP STUDY UNATT&RESP EFFT: CPT | Performed by: SPECIALIST

## 2018-09-06 ENCOUNTER — TELEPHONE (OUTPATIENT)
Dept: SLEEP MEDICINE | Age: 43
End: 2018-09-06

## 2018-09-06 DIAGNOSIS — G47.33 OBSTRUCTIVE SLEEP APNEA (ADULT) (PEDIATRIC): Primary | ICD-10-CM

## 2018-09-13 NOTE — TELEPHONE ENCOUNTER
HSAT demonstrates overall AHI of 6.8/h; events are primarily supine with a supine AHI of 6.1/h. He presents a long history of difficulties with sleep maintenance. Cannot exclude the observed events contributing to sleep maintenance problems. He may benefit from an APAP trial.  He could continue APAP or transition to an oral appliance if he is symptomatically improved. Chief technologist: Please review the HSAT results with the patient. Have generated a prescription for APAP. Please schedule first compliance appointment.

## 2018-09-17 ENCOUNTER — DOCUMENTATION ONLY (OUTPATIENT)
Dept: SLEEP MEDICINE | Age: 43
End: 2018-09-17

## 2018-09-26 ENCOUNTER — TELEPHONE (OUTPATIENT)
Dept: SLEEP MEDICINE | Age: 43
End: 2018-09-26

## 2018-09-26 NOTE — LETTER
11/20/2018 3:53 PM 
 
Mr. Ishaan [phone] Dear Mr. Zachary Joe: 
 
[facility] missed you! Please call our office at [phone] and schedule a follow up appointment for your continued care. Sincerely, Jayy Mcneal

## 2019-04-18 ENCOUNTER — OFFICE VISIT (OUTPATIENT)
Dept: PRIMARY CARE CLINIC | Age: 44
End: 2019-04-18

## 2019-04-18 VITALS
SYSTOLIC BLOOD PRESSURE: 110 MMHG | WEIGHT: 251.8 LBS | BODY MASS INDEX: 31.31 KG/M2 | RESPIRATION RATE: 16 BRPM | OXYGEN SATURATION: 97 % | TEMPERATURE: 97.7 F | DIASTOLIC BLOOD PRESSURE: 78 MMHG | HEIGHT: 75 IN | HEART RATE: 76 BPM

## 2019-04-18 DIAGNOSIS — R07.89 CHEST DISCOMFORT: ICD-10-CM

## 2019-04-18 DIAGNOSIS — Z00.00 WELL ADULT ON ROUTINE HEALTH CHECK: Primary | ICD-10-CM

## 2019-04-18 NOTE — PROGRESS NOTES
Subjective:  
Toby Calix is a 38 y.o. male presenting as a new patient to establish and for his annual checkup. No significant medical hx other than mild hyperlipidemia. Patient reports that she has been having some mid sternal discomfort for the past 6 weeks. Reports that its hard to describe how he feels. Its a very slight chest discomfort in the center of the chest. Denies any chest pain,COLIN symptoms , soa. Reports that he think this sensation started when he was told that his blood pressure was slightly elevated. He does not think its heart related. He regularly work out, lift weight but for the past one he did not. Denies any exertional chest pain, soa. ROS:  Feeling well. No dyspnea or chest pain on exertion. No abdominal pain, change in bowel habits, black or bloody stools. No urinary tract or prostatic symptoms. No neurological complaints. Specific concerns today: refer to HPI. There is no problem list on file for this patient. Current Outpatient Medications Medication Sig Dispense Refill  DIPHENHYDRAMINE HCL (UNISOM, DIPHENHYDRAMINE, PO) Take  by mouth. No Known Allergies Past Medical History:  
Diagnosis Date  History of difficulty sleeping  Hypercholesterolemia Past Surgical History:  
Procedure Laterality Date  HX COLONOSCOPY Family History Problem Relation Age of Onset  Hypertension Mother  Cancer Father  Diabetes Father  Stroke Father  Heart Disease Father 64  Cancer Sister   
     breast  
 
Social History Tobacco Use  Smoking status: Former Smoker Years: 1.00 Last attempt to quit: 2011 Years since quittin.2  Smokeless tobacco: Never Used Substance Use Topics  Alcohol use: Yes Comment: 20 drinks per week (liquor or wine), 20 year drinking history Lab Results Component Value Date/Time  Cholesterol, total 226 (H) 10/30/2017 09:29 AM  
 HDL Cholesterol 48 10/30/2017 09:29 AM  
 LDL, calculated 123 (H) 10/30/2017 09:29 AM  
 Triglyceride 273 (H) 10/30/2017 09:29 AM  
 
Lab Results Component Value Date/Time GFR est non-AA 76 10/30/2017 09:29 AM  
 GFR est AA 88 10/30/2017 09:29 AM  
 Creatinine 1.17 10/30/2017 09:29 AM  
 BUN 14 10/30/2017 09:29 AM  
 Sodium 139 10/30/2017 09:29 AM  
 Potassium 4.0 10/30/2017 09:29 AM  
 Chloride 98 10/30/2017 09:29 AM  
 CO2 23 10/30/2017 09:29 AM  
 
No results found for: HBA1C, TGL9BZZU, HGBE8, LNY8VZML, CXY0ZCJD Objective:  
 
Visit Vitals /78 (BP 1 Location: Right arm, BP Patient Position: Sitting) Pulse 76 Temp 97.7 °F (36.5 °C) (Oral) Resp 16 Ht 6' 3\" (1.905 m) Wt 251 lb 12.8 oz (114.2 kg) SpO2 97% BMI 31.47 kg/m² The patient appears well, alert, oriented x 3, in no distress. ENT normal.  Neck supple. No adenopathy or thyromegaly. JOSSELIN. Lungs are clear, good air entry, no wheezes, rhonchi or rales. S1 and S2 normal, no murmurs, regular rate and rhythm. Abdomen is soft without tenderness, guarding, mass or organomegaly.  exam: deferred. Extremities show no edema, normal peripheral pulses. Neurological is normal without focal findings. Assessment/Plan:  
healthy adult male 
lose weight, increase physical activity, limit alcohol consumption, follow low fat diet, follow low salt diet, continue present plan, routine labs ordered, call if any problems. ICD-10-CM ICD-9-CM 1. Well adult on routine health check Z00.00 V70.0 PSA W/ REFLX FREE PSA  
   LIPID PANEL  
   METABOLIC PANEL, COMPREHENSIVE  
   HEMOGLOBIN A1C WITH EAG  
   CBC WITH AUTOMATED DIFF 2. Chest discomfort R07.89 786.59 Diagnoses and all orders for this visit: 
 
1. Well adult on routine health check -     PSA W/ REFLX FREE PSA 
-     LIPID PANEL 
-     METABOLIC PANEL, COMPREHENSIVE 
-     HEMOGLOBIN A1C WITH EAG 
-     CBC WITH AUTOMATED DIFF 2. Chest discomfort -  Symptoms unlikely related with any cardiac issues. Advised to monitor symptoms. Follow up with me if symptoms persists. Go to ER/urgent care if needed. Follow-up and Dispositions · Return if symptoms worsen or fail to improve. Decrease sugar and alcohol in diet to improve triglycerides Weight management 
 
current treatment plan is effective, no change in therapy 
reviewed diet, exercise and weight control 
cardiovascular risk and specific lipid/LDL goals reviewed.

## 2019-04-18 NOTE — PROGRESS NOTES
Chief Complaint Patient presents with  Complete Physical  
  fasting  Shortness of Breath  
  middle of chest tightness, x1 1/2 mos 1. Have you been to the ER, urgent care clinic since your last visit? Hospitalized since your last visit? Yes [facility] x1 mos ago for cyst removal on back 2. Have you seen or consulted any other health care providers outside of the [address] [place] [address] since your last visit? Include any pap smears or colon screening.  No

## 2019-04-18 NOTE — PATIENT INSTRUCTIONS

## 2019-04-19 LAB
ALBUMIN SERPL-MCNC: 4.7 G/DL (ref 3.5–5.5)
ALBUMIN/GLOB SERPL: 1.7 {RATIO} (ref 1.2–2.2)
ALP SERPL-CCNC: 69 IU/L (ref 39–117)
ALT SERPL-CCNC: 166 IU/L (ref 0–44)
AST SERPL-CCNC: 99 IU/L (ref 0–40)
BASOPHILS # BLD AUTO: 0 X10E3/UL (ref 0–0.2)
BASOPHILS NFR BLD AUTO: 1 %
BILIRUB SERPL-MCNC: 0.3 MG/DL (ref 0–1.2)
BUN SERPL-MCNC: 16 MG/DL (ref 6–24)
BUN/CREAT SERPL: 13 (ref 9–20)
CALCIUM SERPL-MCNC: 9.6 MG/DL (ref 8.7–10.2)
CHLORIDE SERPL-SCNC: 102 MMOL/L (ref 96–106)
CHOLEST SERPL-MCNC: 301 MG/DL (ref 100–199)
CO2 SERPL-SCNC: 22 MMOL/L (ref 20–29)
COMMENT, 011824: ABNORMAL
CREAT SERPL-MCNC: 1.23 MG/DL (ref 0.76–1.27)
EOSINOPHIL # BLD AUTO: 0.1 X10E3/UL (ref 0–0.4)
EOSINOPHIL NFR BLD AUTO: 2 %
ERYTHROCYTE [DISTWIDTH] IN BLOOD BY AUTOMATED COUNT: 14.2 % (ref 12.3–15.4)
EST. AVERAGE GLUCOSE BLD GHB EST-MCNC: 120 MG/DL
GLOBULIN SER CALC-MCNC: 2.7 G/DL (ref 1.5–4.5)
GLUCOSE SERPL-MCNC: 96 MG/DL (ref 65–99)
HBA1C MFR BLD: 5.8 % (ref 4.8–5.6)
HCT VFR BLD AUTO: 40.4 % (ref 37.5–51)
HDLC SERPL-MCNC: 45 MG/DL
HGB BLD-MCNC: 13.9 G/DL (ref 13–17.7)
IMM GRANULOCYTES # BLD AUTO: 0 X10E3/UL (ref 0–0.1)
IMM GRANULOCYTES NFR BLD AUTO: 0 %
LDLC SERPL CALC-MCNC: 235 MG/DL (ref 0–99)
LYMPHOCYTES # BLD AUTO: 1.5 X10E3/UL (ref 0.7–3.1)
LYMPHOCYTES NFR BLD AUTO: 32 %
MCH RBC QN AUTO: 30.6 PG (ref 26.6–33)
MCHC RBC AUTO-ENTMCNC: 34.4 G/DL (ref 31.5–35.7)
MCV RBC AUTO: 89 FL (ref 79–97)
MONOCYTES # BLD AUTO: 0.4 X10E3/UL (ref 0.1–0.9)
MONOCYTES NFR BLD AUTO: 9 %
NEUTROPHILS # BLD AUTO: 2.7 X10E3/UL (ref 1.4–7)
NEUTROPHILS NFR BLD AUTO: 56 %
PLATELET # BLD AUTO: 185 X10E3/UL (ref 150–379)
POTASSIUM SERPL-SCNC: 4 MMOL/L (ref 3.5–5.2)
PROT SERPL-MCNC: 7.4 G/DL (ref 6–8.5)
PSA SERPL-MCNC: 1.7 NG/ML (ref 0–4)
RBC # BLD AUTO: 4.54 X10E6/UL (ref 4.14–5.8)
REFLEX CRITERIA: NORMAL
SODIUM SERPL-SCNC: 138 MMOL/L (ref 134–144)
TRIGL SERPL-MCNC: 106 MG/DL (ref 0–149)
VLDLC SERPL CALC-MCNC: 21 MG/DL (ref 5–40)
WBC # BLD AUTO: 4.8 X10E3/UL (ref 3.4–10.8)

## 2019-04-19 NOTE — PROGRESS NOTES
I have reviewed all the lab results. There are some abnormalities that are not critical to the patient's health, but I would like to discuss these in person at an office appointment. Please ask him to schedule a follow up visit with me .

## 2019-04-22 ENCOUNTER — TELEPHONE (OUTPATIENT)
Dept: PRIMARY CARE CLINIC | Age: 44
End: 2019-04-22

## 2019-04-22 NOTE — TELEPHONE ENCOUNTER
----- Message from Keyla Rainey MD sent at 4/19/2019 11:02 AM EDT -----  I have reviewed all the lab results. There are some abnormalities that are not critical to the patient's health, but I would like to discuss these in person at an office appointment. Please ask him to schedule a follow up visit with me .

## 2019-04-23 ENCOUNTER — TELEPHONE (OUTPATIENT)
Dept: PRIMARY CARE CLINIC | Age: 44
End: 2019-04-23

## 2019-04-23 NOTE — TELEPHONE ENCOUNTER
Attempted to reach patient again and voicemail is full. LVM for pt's wife listed as an emergency contact for her to inform patient to give us a call.

## 2019-05-02 ENCOUNTER — OFFICE VISIT (OUTPATIENT)
Dept: PRIMARY CARE CLINIC | Age: 44
End: 2019-05-02

## 2019-05-02 VITALS
HEART RATE: 81 BPM | WEIGHT: 253.4 LBS | RESPIRATION RATE: 16 BRPM | DIASTOLIC BLOOD PRESSURE: 80 MMHG | TEMPERATURE: 98.1 F | BODY MASS INDEX: 31.51 KG/M2 | SYSTOLIC BLOOD PRESSURE: 128 MMHG | HEIGHT: 75 IN | OXYGEN SATURATION: 98 %

## 2019-05-02 DIAGNOSIS — R74.8 ABNORMAL LIVER ENZYMES: Primary | ICD-10-CM

## 2019-05-02 DIAGNOSIS — R73.03 PREDIABETES: ICD-10-CM

## 2019-05-02 DIAGNOSIS — E78.5 HYPERLIPIDEMIA, UNSPECIFIED HYPERLIPIDEMIA TYPE: ICD-10-CM

## 2019-05-02 NOTE — PROGRESS NOTES
Subjective: Chief Complaint Patient presents with  Results He  is a 38y.o. year old male who presents today to discuss about lab results. His lab results showed very elevated cholesterol level and elevated liver enzymes. A1c is prediabetic range. Lab Results Component Value Date/Time Cholesterol, total 301 (H) 2019 04:11 PM  
 HDL Cholesterol 45 2019 04:11 PM  
 LDL, calculated 235 (H) 2019 04:11 PM  
 VLDL, calculated 21 2019 04:11 PM  
 Triglyceride 106 2019 04:11 PM  
 
Lab Results Component Value Date/Time ALT (SGPT) 166 (H) 2019 04:11 PM  
 AST (SGOT) 99 (H) 2019 04:11 PM  
 Alk. phosphatase 69 2019 04:11 PM  
 Bilirubin, total 0.3 2019 04:11 PM  
 
 
 
 
Drinks 1/2.a 5th of vodka Friday and saturday. Reports that since the last visit he has cut down the intake and he is careful about it. Denies any abdominal pain, N/V. Does not take any daily tylenol products. Exercise regularly. Pertinent items are noted in HPI. Objective:  
 
Vitals:  
 19 1614 BP: 128/80 Pulse: 81 Resp: 16 Temp: 98.1 °F (36.7 °C) TempSrc: Oral  
SpO2: 98% Weight: 253 lb 6.4 oz (114.9 kg) Height: 6' 3\" (1.905 m) Physical Examination: General appearance - alert, well appearing, and in no distress, oriented to person, place, and time and overweight Mental status - alert, oriented to person, place, and time, normal mood, behavior, speech, dress, motor activity, and thought processes Abdomen - soft, nontender, nondistended, no masses or organomegaly No Known Allergies Social History Socioeconomic History  Marital status:  Spouse name: Not on file  Number of children: Not on file  Years of education: Not on file  Highest education level: Not on file Tobacco Use  Smoking status: Former Smoker Years: 1.00 Last attempt to quit: 2011 Years since quittin.3  Smokeless tobacco: Never Used Substance and Sexual Activity  Alcohol use: Yes Comment: 20 drinks per week (liquor or wine), 20 year drinking history  Drug use: No  
 Sexual activity: Yes  
  Partners: Female Family History Problem Relation Age of Onset  Hypertension Mother  Cancer Father  Diabetes Father  Stroke Father  Heart Disease Father 64  Cancer Sister   
     breast  
  
Past Surgical History:  
Procedure Laterality Date  HX COLONOSCOPY Past Medical History:  
Diagnosis Date  History of difficulty sleeping  Hypercholesterolemia Current Outpatient Medications Medication Sig Dispense Refill  DIPHENHYDRAMINE HCL (UNISOM, DIPHENHYDRAMINE, PO) Take  by mouth. Assessment/ Plan:  
Diagnoses and all orders for this visit: 
 
1. Abnormal liver enzymes -     US ABD LTD; Future 
-     HEPATITIS C AB 
-     HEP B SURFACE AG 
      - spent >50 % time counseling about cut down or even quit drinking alcohol because I am concern that his drinking habit is likely the source of the elevated liver enzymes and cholesterol level. Omid Corbett 2. Prediabetes 
     - counseled on avoiding alcohol intake. 3. Hyperlipidemia, unspecified hyperlipidemia type Same as #1. Continue work on cut down/quit alcohol. Will repeat lab in three months and depend on the above test we will discuss options. Medication risks/benefits/costs/interactions/alternatives discussed with patient. Advised patient to call back or return to office if symptoms worsen/change/persist. If patient cannot reach us or should anything more severe/urgent arise he/she should proceed directly to the nearest emergency department. Discussed expected course/resolution/complications of diagnosis in detail with patient. Patient given a written after visit summary which includes her diagnoses, current medications and vitals. Patient expressed understanding with the diagnosis and plan.

## 2019-05-02 NOTE — PROGRESS NOTES
Chief Complaint Patient presents with  Results 1. Have you been to the ER, urgent care clinic since your last visit? Hospitalized since your last visit? No 
 
2. Have you seen or consulted any other health care providers outside of the [address] [place] [address] since your last visit? Include any pap smears or colon screening.  No

## 2019-05-03 LAB
HBV SURFACE AG SERPL QL IA: NEGATIVE
HCV AB S/CO SERPL IA: <0.1 S/CO RATIO (ref 0–0.9)

## 2019-05-09 ENCOUNTER — HOSPITAL ENCOUNTER (OUTPATIENT)
Dept: ULTRASOUND IMAGING | Age: 44
Discharge: HOME | End: 2019-05-09
Attending: FAMILY MEDICINE
Payer: COMMERCIAL

## 2019-05-09 DIAGNOSIS — R74.8 ABNORMAL LIVER ENZYMES: ICD-10-CM

## 2019-05-09 PROCEDURE — 76705 ECHO EXAM OF ABDOMEN: CPT

## 2019-07-02 ENCOUNTER — OFFICE VISIT (OUTPATIENT)
Dept: PRIMARY CARE CLINIC | Age: 44
End: 2019-07-02

## 2019-07-02 VITALS
HEIGHT: 75 IN | TEMPERATURE: 98.2 F | OXYGEN SATURATION: 95 % | RESPIRATION RATE: 18 BRPM | BODY MASS INDEX: 29.86 KG/M2 | WEIGHT: 240.2 LBS | HEART RATE: 71 BPM | DIASTOLIC BLOOD PRESSURE: 70 MMHG | SYSTOLIC BLOOD PRESSURE: 117 MMHG

## 2019-07-02 DIAGNOSIS — E78.5 HYPERLIPIDEMIA, UNSPECIFIED HYPERLIPIDEMIA TYPE: ICD-10-CM

## 2019-07-02 DIAGNOSIS — R74.8 ABNORMAL LIVER ENZYMES: Primary | ICD-10-CM

## 2019-07-02 NOTE — PROGRESS NOTES
Papito Zhang is a 38 y.o. male    Chief Complaint   Patient presents with    Diabetes    Cholesterol Problem     1. Have you been to the ER, urgent care clinic since your last visit? Hospitalized since your last visit? No    2. Have you seen or consulted any other health care providers outside of the [address] [place] [address] since your last visit? Include any pap smears or colon screening. No     There are no preventive care reminders to display for this patient.

## 2019-07-02 NOTE — PROGRESS NOTES
Subjective:     Chief Complaint   Patient presents with    Abnormal liver tests    Cholesterol Problem        He  is a 38y.o. year old male who presents today for follow up on liver enzymes and cholesterol. Liver USG showed fatty liver. He reports that she has been swimming regularly and stopped drinking since last visit. He lost 13 pounds since last visit according to our records but he states that he lost more than that. Denies any N/V. Lab Results   Component Value Date/Time    ALT (SGPT) 166 (H) 04/18/2019 04:11 PM    AST (SGOT) 99 (H) 04/18/2019 04:11 PM    Alk. phosphatase 69 04/18/2019 04:11 PM    Bilirubin, total 0.3 04/18/2019 04:11 PM     Lab Results   Component Value Date/Time    Cholesterol, total 301 (H) 04/18/2019 04:11 PM    HDL Cholesterol 45 04/18/2019 04:11 PM    LDL, calculated 235 (H) 04/18/2019 04:11 PM    VLDL, calculated 21 04/18/2019 04:11 PM    Triglyceride 106 04/18/2019 04:11 PM         Pertinent items are noted in HPI.   Objective:     Vitals:    07/02/19 1620   BP: 117/70   Pulse: 71   Resp: 18   Temp: 98.2 °F (36.8 °C)   TempSrc: Oral   SpO2: 95%   Weight: 240 lb 3.2 oz (109 kg)   Height: 6' 3\" (1.905 m)       Physical Examination: General appearance - alert, well appearing, and in no distress, oriented to person, place, and time and overweight  Mental status - alert, oriented to person, place, and time, normal mood, behavior, speech, dress, motor activity, and thought processes  Chest - clear to auscultation, no wheezes, rales or rhonchi, symmetric air entry  Heart - normal rate, regular rhythm, normal S1, S2, no murmurs, rubs, clicks or gallops    No Known Allergies   Social History     Socioeconomic History    Marital status:      Spouse name: Not on file    Number of children: Not on file    Years of education: Not on file    Highest education level: Not on file   Tobacco Use    Smoking status: Former Smoker     Years: 1.00     Last attempt to quit: 1/1/2011 Years since quittin.5    Smokeless tobacco: Never Used   Substance and Sexual Activity    Alcohol use: Yes     Comment: 20 drinks per week (liquor or wine), 20 year drinking history     Drug use: No    Sexual activity: Yes     Partners: Female      Family History   Problem Relation Age of Onset    Hypertension Mother     Cancer Father     Diabetes Father     Stroke Father     Heart Disease Father         64    Cancer Sister         breast      Past Surgical History:   Procedure Laterality Date    HX COLONOSCOPY        Past Medical History:   Diagnosis Date    History of difficulty sleeping     Hypercholesterolemia       Current Outpatient Medications   Medication Sig Dispense Refill    DIPHENHYDRAMINE HCL (UNISOM, DIPHENHYDRAMINE, PO) Take  by mouth. Assessment/ Plan:   Diagnoses and all orders for this visit:    1. Abnormal liver enzymes  -     HEPATIC FUNCTION PANEL    2. Hyperlipidemia, unspecified hyperlipidemia type  -     LIPID PANEL    Continue work on diet and exercise. Will notify result with recommendation. Medication risks/benefits/costs/interactions/alternatives discussed with patient. Advised patient to call back or return to office if symptoms worsen/change/persist. If patient cannot reach us or should anything more severe/urgent arise he/she should proceed directly to the nearest emergency department. Discussed expected course/resolution/complications of diagnosis in detail with patient. Patient given a written after visit summary which includes her diagnoses, current medications and vitals. Patient expressed understanding with the diagnosis and plan. Follow-up and Dispositions    · Return if symptoms worsen or fail to improve.

## 2019-07-03 LAB
ALBUMIN SERPL-MCNC: 4.3 G/DL (ref 3.5–5.5)
ALP SERPL-CCNC: 50 IU/L (ref 39–117)
ALT SERPL-CCNC: 35 IU/L (ref 0–44)
AST SERPL-CCNC: 29 IU/L (ref 0–40)
BILIRUB DIRECT SERPL-MCNC: 0.09 MG/DL (ref 0–0.4)
BILIRUB SERPL-MCNC: 0.3 MG/DL (ref 0–1.2)
CHOLEST SERPL-MCNC: 208 MG/DL (ref 100–199)
HDLC SERPL-MCNC: 36 MG/DL
LDLC SERPL CALC-MCNC: 159 MG/DL (ref 0–99)
PROT SERPL-MCNC: 6.6 G/DL (ref 6–8.5)
SPECIMEN STATUS REPORT, ROLRST: NORMAL
TRIGL SERPL-MCNC: 64 MG/DL (ref 0–149)
VLDLC SERPL CALC-MCNC: 13 MG/DL (ref 5–40)

## 2019-07-03 NOTE — PROGRESS NOTES
Result sent through my chart. Eric Mr. Mohamud Courser,    Your recent lab results are significantly improved. Liver enzymes are completely in normal range. Cholesterol levels are significantly improved. Great! LDL level is still high but lot better. Continue work on diet and exercise as you are doing now. Will have a follow up cholesterol in 6 months. Great job! Best regards.     Dr. Sukhi Harkins

## 2022-01-06 ENCOUNTER — APPOINTMENT (OUTPATIENT)
Dept: GENERAL RADIOLOGY | Age: 47
End: 2022-01-06
Attending: NURSE PRACTITIONER
Payer: COMMERCIAL

## 2022-01-06 ENCOUNTER — HOSPITAL ENCOUNTER (EMERGENCY)
Age: 47
Discharge: HOME | End: 2022-01-06
Attending: EMERGENCY MEDICINE
Payer: COMMERCIAL

## 2022-01-06 VITALS
OXYGEN SATURATION: 98 % | WEIGHT: 220 LBS | DIASTOLIC BLOOD PRESSURE: 91 MMHG | HEART RATE: 81 BPM | BODY MASS INDEX: 27.35 KG/M2 | TEMPERATURE: 98.4 F | HEIGHT: 75 IN | RESPIRATION RATE: 18 BRPM | SYSTOLIC BLOOD PRESSURE: 139 MMHG

## 2022-01-06 DIAGNOSIS — S93.402A SPRAIN OF LEFT ANKLE, UNSPECIFIED LIGAMENT, INITIAL ENCOUNTER: Primary | ICD-10-CM

## 2022-01-06 PROCEDURE — 99283 EMERGENCY DEPT VISIT LOW MDM: CPT

## 2022-01-06 PROCEDURE — 73610 X-RAY EXAM OF ANKLE: CPT

## 2022-01-06 RX ORDER — IBUPROFEN 400 MG/1
400 TABLET ORAL
Qty: 20 TABLET | Refills: 0 | Status: SHIPPED | OUTPATIENT
Start: 2022-01-06

## 2022-01-06 NOTE — ED PROVIDER NOTES
EMERGENCY DEPARTMENT HISTORY AND PHYSICAL EXAM      Date: 2022  Patient Name: Bar Chapa    History of Presenting Illness     No chief complaint on file. History Provided By: Patient    HPI: Bar Chapa, 51 y.o. male with a past medical history significant hyperlipidemia presents to the ED with cc of ankle pain. Patient was walking into his garage last night and flip-flops when he slipped on 3 steps. Patient rolled his ankle. Patient states it hurts on the lateral aspect of his left ankle. Patient took ibuprofen at home. At first patient cannot put weight on it but once ibuprofen kicked in he was able to walk with hightop boots on. Moderate severity, no known exacerbating or relieving factors, no other associated signs and symptoms    There are no other complaints, changes, or physical findings at this time. PCP: Heriberto Padilla MD    No current facility-administered medications on file prior to encounter. Current Outpatient Medications on File Prior to Encounter   Medication Sig Dispense Refill    DIPHENHYDRAMINE HCL (UNISOM, DIPHENHYDRAMINE, PO) Take  by mouth.          Past History     Past Medical History:  Past Medical History:   Diagnosis Date    History of difficulty sleeping     Hypercholesterolemia        Past Surgical History:  Past Surgical History:   Procedure Laterality Date    HX COLONOSCOPY         Family History:  Family History   Problem Relation Age of Onset    Hypertension Mother     Cancer Father     Diabetes Father     Stroke Father     Heart Disease Father         64    Cancer Sister         breast       Social History:  Social History     Tobacco Use    Smoking status: Former Smoker     Years: 1.00     Quit date: 2011     Years since quittin.0    Smokeless tobacco: Never Used   Substance Use Topics    Alcohol use: Yes     Comment: 20 drinks per week (liquor or wine), 20 year drinking history     Drug use: No       Allergies:  No Known Allergies      Review of Systems     Review of Systems   Constitutional: Negative for chills and fever. HENT: Negative for dental problem and sore throat. Eyes: Negative for pain and visual disturbance. Respiratory: Negative for cough and chest tightness. Cardiovascular: Negative for chest pain. Gastrointestinal: Negative for diarrhea and nausea. Genitourinary: Negative for difficulty urinating and frequency. Musculoskeletal: Positive for arthralgias and joint swelling. Negative for gait problem. Neurological: Negative for numbness and headaches. Hematological: Negative for adenopathy. Does not bruise/bleed easily. Psychiatric/Behavioral: Negative for behavioral problems and suicidal ideas. Physical Exam     Physical Exam  Constitutional:       General: He is not in acute distress. Appearance: Normal appearance. He is not ill-appearing or toxic-appearing. HENT:      Head: Normocephalic and atraumatic. Nose: Nose normal.      Mouth/Throat:      Mouth: Mucous membranes are moist.   Eyes:      Extraocular Movements: Extraocular movements intact. Pupils: Pupils are equal, round, and reactive to light. Cardiovascular:      Rate and Rhythm: Normal rate and regular rhythm. Pulmonary:      Effort: Pulmonary effort is normal.      Breath sounds: Normal breath sounds. Abdominal:      General: Bowel sounds are normal.   Musculoskeletal:      Cervical back: Normal range of motion and neck supple. Left ankle: No deformity or ecchymosis. Tenderness present over the lateral malleolus. Decreased range of motion. Skin:     General: Skin is warm and dry. Capillary Refill: Capillary refill takes less than 2 seconds. Neurological:      General: No focal deficit present. Mental Status: He is alert and oriented to person, place, and time.    Psychiatric:         Mood and Affect: Mood normal.         Behavior: Behavior normal.         Lab and Diagnostic Study Results Labs -   No results found for this or any previous visit (from the past 12 hour(s)). Radiologic Studies -   @lastxrresult@  CT Results  (Last 48 hours)    None        CXR Results  (Last 48 hours)    None            Medical Decision Making   - I am the first provider for this patient. - I reviewed the vital signs, available nursing notes, past medical history, past surgical history, family history and social history. - Initial assessment performed. The patients presenting problems have been discussed, and they are in agreement with the care plan formulated and outlined with them. I have encouraged them to ask questions as they arise throughout their visit. Vital Signs-Reviewed the patient's vital signs. Patient Vitals for the past 12 hrs:   Temp Pulse Resp BP SpO2   01/06/22 1344 98.4 °F (36.9 °C) 81 18 (!) 139/91 98 %       Records Reviewed: Nursing Notes and Old Medical Records          ED Course:          Provider Notes (Medical Decision Making): This patient presents with an ankle injury. Differential diagnosis include ankle sprain, ankle fracture, strain x-rays were negative. There is no sign of fracture or dislocation. Exam and workup consistent with an ankle sprain. Distally neurovascularly intact. The patient is discharged home with conservative management and symptomatic treatment. MDM       Procedures   Medical Decision Makingedical Decision Making  Performed by: Gwyndolyn Hammans, NP  PROCEDURES:  Procedures       Disposition   Disposition: DC- Adult Discharges: All of the diagnostic tests were reviewed and questions answered. Diagnosis, care plan and treatment options were discussed. The patient understands the instructions and will follow up as directed. The patients results have been reviewed with them. They have been counseled regarding their diagnosis.   The patient verbally convey understanding and agreement of the signs, symptoms, diagnosis, treatment and prognosis and additionally agrees to follow up as recommended with their PCP in 24 - 48 hours. They also agree with the care-plan and convey that all of their questions have been answered. I have also put together some discharge instructions for them that include: 1) educational information regarding their diagnosis, 2) how to care for their diagnosis at home, as well a 3) list of reasons why they would want to return to the ED prior to their follow-up appointment, should their condition change. DISCHARGE PLAN:  1. Current Discharge Medication List      CONTINUE these medications which have NOT CHANGED    Details   DIPHENHYDRAMINE HCL (UNISOM, DIPHENHYDRAMINE, PO) Take  by mouth. 2.   Follow-up Information     Follow up With Specialties Details Why Contact Info    Gay Galvan MD Family Medicine   [address] Orthopedic Surgery   [address]  [phone]          3. Return to ED if worse   4. Current Discharge Medication List      START taking these medications    Details   ibuprofen (MOTRIN) 400 mg tablet Take 1 Tablet by mouth every six (6) hours as needed for Pain. Qty: 20 Tablet, Refills: 0  Start date: 1/6/2022               Diagnosis     Clinical Impression:   1. Sprain of left ankle, unspecified ligament, initial encounter        Attestations:    Sruthi Villagomez NP    Please note that this dictation was completed with makerist, the computer voice recognition software. Quite often unanticipated grammatical, syntax, homophones, and other interpretive errors are inadvertently transcribed by the computer software. Please disregard these errors. Please excuse any errors that have escaped final proofreading. Thank you.

## 2022-03-19 PROBLEM — E78.5 HYPERLIPIDEMIA: Status: ACTIVE | Noted: 2019-05-02

## 2022-03-19 PROBLEM — R73.03 PREDIABETES: Status: ACTIVE | Noted: 2019-05-02

## 2022-03-20 PROBLEM — R74.8 ABNORMAL LIVER ENZYMES: Status: ACTIVE | Noted: 2019-05-02

## 2023-03-17 ENCOUNTER — OFFICE VISIT (OUTPATIENT)
Dept: FAMILY MEDICINE CLINIC | Age: 48
End: 2023-03-17

## 2023-03-17 ENCOUNTER — PATIENT MESSAGE (OUTPATIENT)
Dept: FAMILY MEDICINE CLINIC | Age: 48
End: 2023-03-17

## 2023-03-17 VITALS
SYSTOLIC BLOOD PRESSURE: 125 MMHG | DIASTOLIC BLOOD PRESSURE: 84 MMHG | TEMPERATURE: 98 F | OXYGEN SATURATION: 95 % | HEIGHT: 75 IN | RESPIRATION RATE: 16 BRPM | HEART RATE: 79 BPM | WEIGHT: 244 LBS | BODY MASS INDEX: 30.34 KG/M2

## 2023-03-17 DIAGNOSIS — R73.03 PREDIABETES: ICD-10-CM

## 2023-03-17 DIAGNOSIS — E78.5 HYPERLIPIDEMIA, UNSPECIFIED HYPERLIPIDEMIA TYPE: ICD-10-CM

## 2023-03-17 DIAGNOSIS — R03.0 ELEVATED BLOOD PRESSURE READING: ICD-10-CM

## 2023-03-17 DIAGNOSIS — Z00.00 ROUTINE MEDICAL EXAM: Primary | ICD-10-CM

## 2023-03-17 DIAGNOSIS — Z12.11 SCREEN FOR COLON CANCER: ICD-10-CM

## 2023-03-17 DIAGNOSIS — B35.1 ONYCHOMYCOSIS: ICD-10-CM

## 2023-03-17 NOTE — LETTER
3/17/2023 9:21 AM    Mr. Lars Galindo  [address] [facility] [address]      To Medical Records    Please fax us the most recent office notes, labs, imaging, plan of care, and colonoscopy so that we may update the patient's records for continuity of care.      Our fax number: [phone]    Patient:   Lars Galindo  1975        Sincerely,      Neil Pearson MD

## 2023-03-17 NOTE — PROGRESS NOTES
Subjective:   Cher Velázquez is a 49 y.o. male presenting for his annual checkup. ROS:  Feeling well. No dyspnea or chest pain on exertion. No abdominal pain, change in bowel habits, black or bloody stools. No urinary tract or prostatic symptoms. No neurological complaints. Specific concerns today:     BP was high but repeat was better. Never had BP issues before. Doesn't use excessive salt. - Keep log for 2 weeks. Right hand 4th finger has onychomycosis. Started one day randomly and has been worsening. No trauma. He hasn't tried anything for it. Does not want PO treatment (upon discussion of possible side effects). - - Vinegar soaks and file down  - F/up in 2 weeks.  Can add topical treatment then    Patient Active Problem List   Diagnosis Code    Abnormal liver enzymes R74.8    Prediabetes R73.03    Hyperlipidemia E78.5     Patient Active Problem List    Diagnosis Date Noted    Abnormal liver enzymes 2019    Prediabetes 2019    Hyperlipidemia 2019       No Known Allergies  Past Medical History:   Diagnosis Date    History of difficulty sleeping     Hypercholesterolemia      Past Surgical History:   Procedure Laterality Date    HX COLONOSCOPY       Family History   Problem Relation Age of Onset    Hypertension Mother     Cancer Father     Diabetes Father     Stroke Father     Heart Disease Father         64    Cancer Sister         breast     Social History     Tobacco Use    Smoking status: Former     Years: 1.00     Types: Cigarettes     Quit date: 2011     Years since quittin.2    Smokeless tobacco: Never   Substance Use Topics    Alcohol use: Yes     Comment: 20 drinks per week (liquor or wine), 20 year drinking history         Lab Results   Component Value Date/Time    WBC 4.8 2019 04:11 PM    HGB 13.9 2019 04:11 PM    HCT 40.4 2019 04:11 PM    PLATELET 157  04:11 PM    MCV 89 2019 04:11 PM     Lab Results   Component Value Date/Time Hemoglobin A1c 5.8 (H) 04/18/2019 04:11 PM    Glucose 96 04/18/2019 04:11 PM    LDL, calculated 159 (H) 07/02/2019 04:36 PM    Creatinine 1.23 04/18/2019 04:11 PM      Lab Results   Component Value Date/Time    Cholesterol, total 208 (H) 07/02/2019 04:36 PM    HDL Cholesterol 36 (L) 07/02/2019 04:36 PM    LDL, calculated 159 (H) 07/02/2019 04:36 PM    Triglyceride 64 07/02/2019 04:36 PM     Lab Results   Component Value Date/Time    ALT (SGPT) 35 07/02/2019 04:36 PM    Alk. phosphatase 50 07/02/2019 04:36 PM    Bilirubin, direct 0.09 07/02/2019 04:36 PM    Bilirubin, total 0.3 07/02/2019 04:36 PM    Albumin 4.3 07/02/2019 04:36 PM    Protein, total 6.6 07/02/2019 04:36 PM    PLATELET 251 17/31/0928 04:11 PM       Lab Results   Component Value Date/Time    GFR est non-AA 70 04/18/2019 04:11 PM    GFR est AA 81 04/18/2019 04:11 PM    Creatinine 1.23 04/18/2019 04:11 PM    BUN 16 04/18/2019 04:11 PM    Sodium 138 04/18/2019 04:11 PM    Potassium 4.0 04/18/2019 04:11 PM    Chloride 102 04/18/2019 04:11 PM    CO2 22 04/18/2019 04:11 PM     Lab Results   Component Value Date/Time    Prostate Specific Ag 1.7 04/18/2019 04:11 PM     Lab Results   Component Value Date/Time    Glucose 96 04/18/2019 04:11 PM         Objective:     Visit Vitals  /88   Pulse 79   Temp 98 °F (36.7 °C)   Resp 16   Ht 6' 3\" (1.905 m)   Wt 244 lb (110.7 kg)   SpO2 95%   BMI 30.50 kg/m²     The patient appears well, alert, oriented x 3, in no distress. ENT normal.  Neck supple. No adenopathy or thyromegaly. JOSSELIN. Lungs are clear, good air entry, no wheezes, rhonchi or rales. S1 and S2 normal, no murmurs, regular rate and rhythm. Abdomen is soft without tenderness, guarding, mass or organomegaly.  exam: Deferred. Extremities show no edema, normal peripheral pulses. Neurological is normal without focal findings.  Right 4th finger of upper extremity with onychomycosis    Assessment/Plan:   healthy adult male  lose weight, increase physical activity, limit alcohol consumption, bring BP log to office visit, follow low salt diet, routine labs ordered, call if any problems. High BP:  - Avoid salt  - RTC 2 weeks with log    Onychomycosis:  - Vinegar soaks and file down  - F/up in 2 weeks. Can add topical treatment then      ICD-10-CM ICD-9-CM    1. Routine medical exam  Z00.00 V70.0 CBC W/O DIFF      METABOLIC PANEL, COMPREHENSIVE      HEMOGLOBIN A1C WITH EAG      LIPID PANEL      PSA W/ REFLX FREE PSA      PSA W/ REFLX FREE PSA      LIPID PANEL      HEMOGLOBIN A1C WITH EAG      METABOLIC PANEL, COMPREHENSIVE      CBC W/O DIFF      2. Prediabetes  R73.03 790.29 CBC W/O DIFF      METABOLIC PANEL, COMPREHENSIVE      HEMOGLOBIN A1C WITH EAG      LIPID PANEL      LIPID PANEL      HEMOGLOBIN A1C WITH EAG      METABOLIC PANEL, COMPREHENSIVE      CBC W/O DIFF      3. Hyperlipidemia, unspecified hyperlipidemia type  E78.5 272.4 CBC W/O DIFF      METABOLIC PANEL, COMPREHENSIVE      HEMOGLOBIN A1C WITH EAG      LIPID PANEL      LIPID PANEL      HEMOGLOBIN A1C WITH EAG      METABOLIC PANEL, COMPREHENSIVE      CBC W/O DIFF      4. Elevated blood pressure reading  R03.0 796.2       5. Screen for colon cancer  Z12.11 V76.51 REFERRAL FOR COLONOSCOPY      .

## 2023-03-17 NOTE — PROGRESS NOTES
1. Have you been to the ER, urgent care clinic since your last visit? Hospitalized since your last visit? No    2. Have you seen or consulted any other health care providers outside of the [address] [place] [address] since your last visit? Include any pap smears or colon screening. No  Reviewed record in preparation for visit and have necessary documentation  Pt did not bring medication to office visit for review  opportunity was given for questions      3. For patients aged 39-70: Has the patient had a colonoscopy / FIT/ Cologuard? Yes - Care Gap present. Rooming MA/LPN to request most recent results      If the patient is female:    4. For patients aged 41-77: Has the patient had a mammogram within the past 2 years? NA - based on age or sex      11. For patients aged 21-65: Has the patient had a pap smear?  NA - based on age or sex      Goals that were addressed and/or need to be completed during or after this appointment include   Health Maintenance Due   Topic Date Due    Depression Screen  Never done    COVID-19 Vaccine (1) Never done    Colorectal Cancer Screening Combo  Never done    A1C test (Diabetic or Prediabetic)  04/18/2020    Flu Vaccine (1) 08/01/2022

## 2023-03-17 NOTE — PATIENT INSTRUCTIONS
Colon Cancer Screening: Care Instructions  Your Care Instructions    Colorectal cancer occurs in the colon or rectum. That's the lower part of your digestive system. It is the second-leading cause of cancer deaths in the [place]. It often starts with small growths called polyps in the colon or rectum. Polyps are usually found with screening tests. Depending on the type of test, any polyps found may be removed during the tests. Colorectal cancer usually does not cause symptoms at first. But regular tests can help find it early, before it spreads and becomes harder to treat. Experts advise routine tests for colon cancer for people starting at age 48. And they advise people with a higher risk of colon cancer to get tested sooner. Talk with your doctor about when you should start testing. Discuss which tests you need. Follow-up care is a key part of your treatment and safety. Be sure to make and go to all appointments, and call your doctor if you are having problems. It's also a good idea to know your test results and keep a list of the medicines you take. What are the main screening tests for colon cancer? · Stool tests. These include the fecal immunochemical test (FIT) and the fecal occult blood test (FOBT). These tests check stool samples for signs of cancer. If your test is positive, you will need to have a colonoscopy. · Sigmoidoscopy. This test lets your doctor look at the lining of your rectum and the lowest part of your colon. Your doctor uses a lighted tube called a sigmoidoscope. This test can't find cancers or polyps in the upper part of your colon. In some cases, polyps that are found can be removed. But if your doctor finds polyps, you will need to have a colonoscopy to check the upper part of your colon. · Colonoscopy. This test lets your doctor look at the lining of your rectum and your entire colon. The doctor uses a thin, flexible tool called a colonoscope.  It can also be used to remove polyps or get a tissue sample (biopsy). What tests do you need? The following guidelines are for people age 48 and over who are not at high risk for colorectal cancer. You may have at least one of these tests as directed by your doctor. · Fecal immunochemical test (FIT) or fecal occult blood test (FOBT) every year  · Sigmoidoscopy every 5 years  · Colonoscopy every 10 years  If you are age 68 to 80, you can work with your doctor to decide if screening is a good option. If you are age 80 or older, your doctor will likely advise that screening is not helpful. Talk with your doctor about when you need to be tested. And discuss which tests are right for you. Your doctor may recommend earlier or more frequent testing if you:  · Have had colorectal cancer before. · Have had colon polyps. · Have symptoms of colorectal cancer. These include blood in your stool and changes in your bowel habits. · Have a parent, brother or sister, or child with colon polyps or colorectal cancer. · Have a bowel disease. This includes ulcerative colitis and Crohn's disease. · Have a rare polyp syndrome that runs in families, such as familial adenomatous polyposis (FAP). · Have had radiation treatments to the belly or pelvis. When should you call for help? Watch closely for changes in your health, and be sure to contact your doctor if:    · You have any changes in your bowel habits.     · You have any problems. Where can you learn more? Go to http://danitza-codey.info/. Enter M541 in the search box to learn more about \"Colon Cancer Screening: Care Instructions. \"  Current as of: March 28, 2018  Content Version: 11.8  © 9566-7721 [employer]. Care instructions adapted under license by [employer] (which disclaims liability or warranty for this information).  If you have questions about a medical condition or this instruction, always ask your healthcare professional. Zhane [employer] disclaims any warranty or liability for your use of this information.

## 2023-03-18 LAB
ALBUMIN SERPL-MCNC: 4.1 G/DL (ref 3.5–5)
ALBUMIN/GLOB SERPL: 1.4 (ref 1.1–2.2)
ALP SERPL-CCNC: 66 U/L (ref 45–117)
ALT SERPL-CCNC: 43 U/L (ref 12–78)
ANION GAP SERPL CALC-SCNC: 8 MMOL/L (ref 5–15)
AST SERPL-CCNC: 34 U/L (ref 15–37)
BILIRUB SERPL-MCNC: 0.4 MG/DL (ref 0.2–1)
BUN SERPL-MCNC: 13 MG/DL (ref 6–20)
BUN/CREAT SERPL: 12 (ref 12–20)
CALCIUM SERPL-MCNC: 9.4 MG/DL (ref 8.5–10.1)
CHLORIDE SERPL-SCNC: 106 MMOL/L (ref 97–108)
CHOLEST SERPL-MCNC: 303 MG/DL
CO2 SERPL-SCNC: 24 MMOL/L (ref 21–32)
CREAT SERPL-MCNC: 1.06 MG/DL (ref 0.7–1.3)
ERYTHROCYTE [DISTWIDTH] IN BLOOD BY AUTOMATED COUNT: 12.6 % (ref 11.5–14.5)
EST. AVERAGE GLUCOSE BLD GHB EST-MCNC: 126 MG/DL
GLOBULIN SER CALC-MCNC: 2.9 G/DL (ref 2–4)
GLUCOSE SERPL-MCNC: 90 MG/DL (ref 65–100)
HBA1C MFR BLD: 6 % (ref 4–5.6)
HCT VFR BLD AUTO: 42.4 % (ref 36.6–50.3)
HDLC SERPL-MCNC: 54 MG/DL
HDLC SERPL: 5.6 (ref 0–5)
HGB BLD-MCNC: 14 G/DL (ref 12.1–17)
LDLC SERPL CALC-MCNC: 215.4 MG/DL (ref 0–100)
MCH RBC QN AUTO: 31.1 PG (ref 26–34)
MCHC RBC AUTO-ENTMCNC: 33 G/DL (ref 30–36.5)
MCV RBC AUTO: 94.2 FL (ref 80–99)
NRBC # BLD: 0 K/UL (ref 0–0.01)
NRBC BLD-RTO: 0 PER 100 WBC
PLATELET # BLD AUTO: 196 K/UL (ref 150–400)
PMV BLD AUTO: 11.6 FL (ref 8.9–12.9)
POTASSIUM SERPL-SCNC: 4.2 MMOL/L (ref 3.5–5.1)
PROT SERPL-MCNC: 7 G/DL (ref 6.4–8.2)
RBC # BLD AUTO: 4.5 M/UL (ref 4.1–5.7)
SODIUM SERPL-SCNC: 138 MMOL/L (ref 136–145)
TRIGL SERPL-MCNC: 168 MG/DL (ref ?–150)
VLDLC SERPL CALC-MCNC: 33.6 MG/DL
WBC # BLD AUTO: 4.8 K/UL (ref 4.1–11.1)

## 2023-03-19 LAB
PSA SERPL-MCNC: 1.9 NG/ML (ref 0–4)
REFLEX CRITERIA: NORMAL

## 2023-03-24 ENCOUNTER — VIRTUAL VISIT (OUTPATIENT)
Dept: FAMILY MEDICINE CLINIC | Age: 48
End: 2023-03-24

## 2023-05-03 ENCOUNTER — PATIENT MESSAGE (OUTPATIENT)
Dept: FAMILY MEDICINE CLINIC | Age: 48
End: 2023-05-03

## 2023-05-24 RX ORDER — ATORVASTATIN CALCIUM 40 MG/1
40 TABLET, FILM COATED ORAL DAILY
COMMUNITY
Start: 2023-03-21

## 2023-05-24 RX ORDER — METFORMIN HYDROCHLORIDE 500 MG/1
500 TABLET, EXTENDED RELEASE ORAL
COMMUNITY
Start: 2023-03-21

## 2025-06-09 NOTE — PROGRESS NOTES
Discharge information discussed with pt. All questions answered. All belongings with pt. Md at bedside to discuss instructions as well.    Please call patient:    The liver is diffusely echogenic with fatty liver changes. No other mass or focal abnormality noted.